# Patient Record
Sex: MALE | Race: WHITE | Employment: OTHER | URBAN - NONMETROPOLITAN AREA
[De-identification: names, ages, dates, MRNs, and addresses within clinical notes are randomized per-mention and may not be internally consistent; named-entity substitution may affect disease eponyms.]

---

## 2017-01-04 DIAGNOSIS — F33.1 MAJOR DEPRESSIVE DISORDER, RECURRENT EPISODE, MODERATE (H): Primary | ICD-10-CM

## 2017-01-06 RX ORDER — SERTRALINE HYDROCHLORIDE 100 MG/1
200 TABLET, FILM COATED ORAL DAILY
Qty: 180 TABLET | Refills: 0 | Status: SHIPPED | OUTPATIENT
Start: 2017-01-06 | End: 2017-04-03

## 2017-01-06 NOTE — TELEPHONE ENCOUNTER
PHQ-9 SCORE 12/18/2015 1/29/2016 10/7/2016   Total Score - - -   Total Score 4 9 4     SHAD-7 SCORE 12/10/2015 1/29/2016 10/7/2016   Total Score - - -   Total Score 2 0 2       Refill given.  SHARLA Riddle, RN

## 2017-01-12 ENCOUNTER — OFFICE VISIT (OUTPATIENT)
Dept: FAMILY MEDICINE | Facility: CLINIC | Age: 47
End: 2017-01-12
Payer: COMMERCIAL

## 2017-01-12 VITALS
WEIGHT: 220 LBS | SYSTOLIC BLOOD PRESSURE: 136 MMHG | RESPIRATION RATE: 18 BRPM | BODY MASS INDEX: 29.83 KG/M2 | DIASTOLIC BLOOD PRESSURE: 88 MMHG | OXYGEN SATURATION: 98 % | TEMPERATURE: 98.3 F | HEART RATE: 104 BPM

## 2017-01-12 DIAGNOSIS — I67.83 PRES (POSTERIOR REVERSIBLE ENCEPHALOPATHY SYNDROME): ICD-10-CM

## 2017-01-12 DIAGNOSIS — G89.29 CHRONIC BILATERAL LOW BACK PAIN WITHOUT SCIATICA: Primary | ICD-10-CM

## 2017-01-12 DIAGNOSIS — R56.9 CONVULSIONS, UNSPECIFIED CONVULSION TYPE (H): ICD-10-CM

## 2017-01-12 DIAGNOSIS — F10.21 ALCOHOL DEPENDENCE IN REMISSION (H): ICD-10-CM

## 2017-01-12 DIAGNOSIS — F11.20 CHRONIC NARCOTIC DEPENDENCE (H): ICD-10-CM

## 2017-01-12 DIAGNOSIS — I10 HTN, GOAL BELOW 140/90: ICD-10-CM

## 2017-01-12 DIAGNOSIS — M54.50 CHRONIC BILATERAL LOW BACK PAIN WITHOUT SCIATICA: Primary | ICD-10-CM

## 2017-01-12 PROCEDURE — 99214 OFFICE O/P EST MOD 30 MIN: CPT | Performed by: NURSE PRACTITIONER

## 2017-01-12 RX ORDER — MORPHINE SULFATE 30 MG/1
30 TABLET, FILM COATED, EXTENDED RELEASE ORAL 3 TIMES DAILY
Qty: 42 TABLET | Refills: 0 | Status: SHIPPED | OUTPATIENT
Start: 2017-01-12 | End: 2017-01-12

## 2017-01-12 RX ORDER — OXYCODONE HYDROCHLORIDE 15 MG/1
15 TABLET ORAL EVERY 4 HOURS PRN
Qty: 84 TABLET | Refills: 0 | Status: SHIPPED | OUTPATIENT
Start: 2017-01-12 | End: 2017-01-12

## 2017-01-12 RX ORDER — OXYCODONE HYDROCHLORIDE 15 MG/1
15 TABLET ORAL EVERY 4 HOURS PRN
Qty: 84 TABLET | Refills: 0 | Status: SHIPPED | OUTPATIENT
Start: 2017-01-26 | End: 2017-02-17

## 2017-01-12 RX ORDER — DIAZEPAM 5 MG
5 TABLET ORAL 2 TIMES DAILY PRN
Qty: 60 TABLET | Refills: 3 | Status: SHIPPED | OUTPATIENT
Start: 2017-01-12 | End: 2017-07-12

## 2017-01-12 RX ORDER — MORPHINE SULFATE 30 MG/1
30 TABLET, FILM COATED, EXTENDED RELEASE ORAL 3 TIMES DAILY
Qty: 42 TABLET | Refills: 0 | Status: SHIPPED | OUTPATIENT
Start: 2017-01-26 | End: 2017-02-17

## 2017-01-12 NOTE — NURSING NOTE
Chief Complaint   Patient presents with     Back Pain       Initial /88 mmHg  Pulse 104  Temp(Src) 98.3  F (36.8  C) (Tympanic)  Resp 18  Wt 220 lb (99.791 kg)  SpO2 98% Estimated body mass index is 29.83 kg/(m^2) as calculated from the following:    Height as of 8/26/16: 6' (1.829 m).    Weight as of this encounter: 220 lb (99.791 kg).  BP completed using cuff size: large

## 2017-01-12 NOTE — PATIENT INSTRUCTIONS
[facility] number- [phone]  Need to call before 8 am to check for a bed  Start callin as soon as you can   Plan I also recommend will be seeing Mikayla Nieves MD thereafter for Suboxone.    This will be the last time I refill the narcotics for you.

## 2017-01-12 NOTE — PROGRESS NOTES
SUBJECTIVE:                                                    Torey Coreas is a 46 year old male who presents to clinic today for the following health issues:        Chronic Pain Follow-Up       Type / Location of Pain: Back, shoulders and neck pain  Analgesia/pain control:       Recent changes:  same      Overall control: Tolerable with discomfort  Activity level/function:      Daily activities:  Able to do moderate activities    Work:  Unable to work  Adverse effects:  No  Adherance    Taking medication as directed?  Yes    Participating in other treatments: yes, patient is going to the pain clinic  Risk Factors:    Sleep:  Poor    Mood/anxiety:  controlled    Recent family or social stressors:  none noted    Other aggravating factors: prolonged sitting, prolonged standing and poor posture  PHQ-9 SCORE 12/18/2015 1/29/2016 10/7/2016   Total Score - - -   Total Score 4 9 4     SHAD-7 SCORE 12/10/2015 1/29/2016 10/7/2016   Total Score - - -   Total Score 2 0 2     Encounter-Level CSA - 3/21/16:               Controlled Substance Agreement - Scan on 3/28/2016  2:09 PM : CONTROLLED SUBSTANCE AGREEMENT 03/21/2016 (below)                 Amount of exercise or physical activity: None    Problems taking medications regularly: No    Medication side effects: none    Diet: regular (no restrictions)    Patient was seen by pain/substance abuse specialist Mikayla Nieves 12/30   Recommendation is inpatient detox at [place] [place]   She would then prescribe suboxone     I spoke with her on Tuesday and agreed that this was the best plan    i called the Darrell also on Tuesday and he is in agreement with doing this but says that it cant happen for at least 2 weeks as he has to make arrangements for his son and make sure his wife will not be traveling for work     I have agreed to refill his narcotics for this last month  He understands that I will not be prescribing any narcotics for him from this point on as it is not safe nor is it  "recommended for him     He has continued sobriety today  He reports having \"food poisoning\"  With vomiting and diarrhea  He is not concerned by this   Denies any need to intervention or further testing  Reports it is self resolving           Problem list and histories reviewed & adjusted, as indicated.  Additional history: as documented    Problem list, Medication list, Allergies, and Medical/Social/Surgical histories reviewed in [facility] and updated as appropriate.    ROS:  Constitutional, HEENT, cardiovascular, pulmonary, gi and gu systems are negative, except as otherwise noted.    OBJECTIVE:                                                    /88 mmHg  Pulse 104  Temp(Src) 98.3  F (36.8  C) (Tympanic)  Resp 18  Wt 220 lb (99.791 kg)  SpO2 98%  Body mass index is 29.83 kg/(m^2).  GENERAL APPEARANCE: healthy, alert and no distress  ORTHO: ambulated with a stiff slow gait, decreased ROM of lumbar spine  PSYCH: mentation appears normal and affect normal/bright    Diagnostic test results:  Diagnostic Test Results:  none      ASSESSMENT/PLAN:                                                    1. Chronic bilateral low back pain without sciatica  Discussed with patient over the phone and today that this would be the last time he would be prescribed narcotics from me as it is not safe and plan has been put into place by specialist for detox and then suboxone   He tells me he is unsure about the suboxone but verbalized understanding that I will no longer be prescribing these  The number was provided to [facility] detox, he has been advised to call first thing in the morning before 8 am to see about bed availability    - diazepam (VALIUM) 5 MG tablet; Take 1 tablet (5 mg) by mouth 2 times daily as needed  Dispense: 60 tablet; Refill: 3  - morphine (MS CONTIN) 30 MG 12 hr tablet; Take 1 tablet (30 mg) by mouth 3 times daily  Dispense: 42 tablet; Refill: 0  - oxyCODONE (ROXICODONE) 15 MG IR tablet; Take 1 tablet (15 mg) " by mouth every 4 hours as needed for moderate to severe pain  Dispense: 84 tablet; Refill: 0    2. Chronic narcotic dependence (H)  As above    3. Convulsions, unspecified convulsion type (H)  stable    4. Alcohol dependence in remission (H)  stable    5. PRES (posterior reversible encephalopathy syndrome)  Resolved per neurology/ recent MRI    6. HTN, goal below 140/90  Stable today       Patient Instructions   [facility] number- [phone]  Need to call before 8 am to check for a bed  Start callin as soon as you can   Plan I also recommend will be seeing Mikayla Nieves MD thereafter for Suboxone.    This will be the last time I refill the narcotics for you.             Irene Shultz NP  [facility]

## 2017-01-30 DIAGNOSIS — G40.909 SEIZURE DISORDER (H): Primary | ICD-10-CM

## 2017-01-30 RX ORDER — LEVETIRACETAM 1000 MG/1
1000 TABLET ORAL 2 TIMES DAILY
Qty: 180 TABLET | Refills: 1 | Status: SHIPPED | OUTPATIENT
Start: 2017-01-30 | End: 2017-08-11

## 2017-01-30 NOTE — TELEPHONE ENCOUNTER
LEVETIRACETAM      Last Written Prescription Date: 4/28/16  Last Fill Quantity: 180,  # refills: 1   Last Office Visit with [facility], [facility] or [facility] prescribing provider: 1/12/17

## 2017-01-31 ENCOUNTER — TELEPHONE (OUTPATIENT)
Dept: FAMILY MEDICINE | Facility: CLINIC | Age: 47
End: 2017-01-31

## 2017-01-31 NOTE — TELEPHONE ENCOUNTER
Patients wife Britni left a message stating she would like to speak with Liana regarding Darrell's treatment plan so she can better understand it. She is authorized per release.    Kayli Irene-Station [place]

## 2017-01-31 NOTE — TELEPHONE ENCOUNTER
Britni has concerned with patient going through detox and pain not being adequately controlled. She has done some research on suboxone. I have recommended that she contact Dr. Nieves regarding these questions and concerns. I reinforced my agreement with this plan.   Liana Shultz NP

## 2017-02-01 ENCOUNTER — TELEPHONE (OUTPATIENT)
Dept: PALLIATIVE MEDICINE | Facility: CLINIC | Age: 47
End: 2017-02-01

## 2017-02-01 NOTE — TELEPHONE ENCOUNTER
Wife Jessica lm asking to speak with Mikayla Nieves about her .  Jessica states there is a signed release allowing this.     Chart reviewed.  The most recent LISA  was signed 3-25-15, expires one year after this date.   Brenda Severson RN, BA

## 2017-02-01 NOTE — TELEPHONE ENCOUNTER
Called and left a message for Jessica that the LISA had  and Nurse will send  another one for her  to sign .  Margo perez rn

## 2017-02-14 ENCOUNTER — TELEPHONE (OUTPATIENT)
Dept: PALLIATIVE MEDICINE | Facility: CLINIC | Age: 47
End: 2017-02-14

## 2017-02-14 NOTE — TELEPHONE ENCOUNTER
Message left at 1418:    Wife, Jessica, states that a new authorization to speak with providers was completed and was sent last Tuesday so hoping that it was received and that someone will be able to call her back. States that pt has withdrawn from opioid medications and she would like to talk about next steps. Please call back at [phone].   -----  Will route to nurse [place] for review.     TO ShettyN, RN-BC  Patient Care Supervisor/Care Coordinator  [facility] [place]

## 2017-02-15 ENCOUNTER — MYC MEDICAL ADVICE (OUTPATIENT)
Dept: ADDICTION MEDICINE | Facility: CLINIC | Age: 47
End: 2017-02-15

## 2017-02-15 ENCOUNTER — MYC MEDICAL ADVICE (OUTPATIENT)
Dept: PALLIATIVE MEDICINE | Facility: CLINIC | Age: 47
End: 2017-02-15

## 2017-02-15 NOTE — TELEPHONE ENCOUNTER
I called Keron to gather more information. He has not had any opioids since 2/9/17 at noon. He reports going through severe withdrawal at his home but reports that has now resolved. He is having extreme back pain and is only laying in bed. He is unable to come for an office visit because his wife who drives him is out of town but he would like you to fax a prescription for Suboxone to his pharmacy. [phone].     Sheree Yun, TON, RN  Care Coordinator  [facility] [place]

## 2017-02-15 NOTE — TELEPHONE ENCOUNTER
I left a voicemail for Jessica letting her know that I do not see the release to communicate in [facility] chart but that I will keep my out for it and call her back when I see it . Another option would be for her to drop it off or to re fax it.     TO HebertN, RN  Care Coordinator  [facility] [place]

## 2017-02-15 NOTE — TELEPHONE ENCOUNTER
[place] from patient 2/15/17 10:18 am    Dr Nieves,    I have detoxed at home since Thursday at 12p.m. because there were no beds at [facility]. But I am not really pleased with this whole process, been laying in bed now for 6 days with no respose. Are you able to call in a script to [facility] in [place]?  If there are any doubt about detox, I can have blood drawn here in [place].    Sincerely,   Torey Coreas  [phone]    Sheree Yun, TON, RN  Care Coordinator  [facility]

## 2017-02-15 NOTE — TELEPHONE ENCOUNTER
[employer] message sent to patient:      Torey,     I have not seen you since our initial visit in mid December.  I would need to see you in the office to initiate suboxone maintenance.  I do not do this over the phone or [employer].  I would recommend calling [facility] detox if you are having trouble with withdrawals.  This was the plan that your PCP and I had discussed.      Call [facility] intake at [phone] at 0800 preferably prior to going to the ER to inquire about bed availability.     Mikayla Nieves MD

## 2017-02-16 ENCOUNTER — TELEPHONE (OUTPATIENT)
Dept: FAMILY MEDICINE | Facility: CLINIC | Age: 47
End: 2017-02-16

## 2017-02-16 NOTE — TELEPHONE ENCOUNTER
Spoke with pt who relays same info as 02-15-17 [employer] message regarding opioid withdrawal and request for suboxone.  Has not viewed [employer] reply from Dr. Nieves as noted below, informed pt of Dr. Nieves's recommendations.  States feels like has already detoxed and denied need to contact [facility].  Advised pt reply to Dr. Nieves's message and also call pain clinic to schedule appt with Dr. Nieves.  Forwarded to Liana for review, any further recommendations.  SHARLA Riddle RN      Note      [employer] message sent to patient:        Torey,      I have not seen you since our initial visit in mid December. I would need to see you in the office to initiate suboxone maintenance. I do not do this over the phone or [employer]. I would recommend calling [facility] detox if you are having trouble with withdrawals. This was the plan that your PCP and I had discussed.      Call [facility] Det[facility] intake at [phone] at 0800 preferably prior to going to the ER to inquire about bed availability.      Mikayla Nieves MD

## 2017-02-16 NOTE — TELEPHONE ENCOUNTER
Darrell has scheduled an appointment for 3:00 pm tomorrow.    TO HebertN, RN  Care Coordinator  [facility] [place]

## 2017-02-16 NOTE — TELEPHONE ENCOUNTER
Dr Nieves,    [facility]? That's a joke, never a bed to be had! [facility]way, it has been 7 days as of noon today, so I am opiate free. I will try to set up an appt here, and I'll call.    Torey Coreas  ----- Message -----  From: Mikayla Nieves MD  Sent: 2/15/2017  3:44 PM CST  To: Darrell Coreas  Subject: suboxone    Torey,     I have not seen you since our initial visit in mid December.  I would need to see you in the office to initiate suboxone maintenance.  I do not do this over the phone or [employer].  I would recommend calling [facility] detox if you are having trouble with withdrawals.  This was the plan that your PCP and I had discussed.      Call [facility] Detox intake at [phone] at 0800 preferably prior to going to the ER to inquire about bed availability.     Mikayla Nieves MD

## 2017-02-16 NOTE — TELEPHONE ENCOUNTER
Patient is calling requesting to speak with Liana Shultz. He states he has called his pain provider and hasn't heard anything. Is looking for some advise from Liana.    Kayli Irene-Station

## 2017-02-17 ENCOUNTER — OFFICE VISIT (OUTPATIENT)
Dept: PALLIATIVE MEDICINE | Facility: CLINIC | Age: 47
End: 2017-02-17
Payer: COMMERCIAL

## 2017-02-17 VITALS — SYSTOLIC BLOOD PRESSURE: 110 MMHG | HEART RATE: 96 BPM | RESPIRATION RATE: 100 BRPM | DIASTOLIC BLOOD PRESSURE: 72 MMHG

## 2017-02-17 DIAGNOSIS — Z79.899 ENCOUNTER FOR LONG-TERM (CURRENT) USE OF HIGH-RISK MEDICATION: Primary | ICD-10-CM

## 2017-02-17 DIAGNOSIS — M54.5 CHRONIC MIDLINE LOW BACK PAIN, WITH SCIATICA PRESENCE UNSPECIFIED: ICD-10-CM

## 2017-02-17 DIAGNOSIS — F11.20 OPIOID USE DISORDER, SEVERE, ON MAINTENANCE THERAPY, DEPENDENCE (H): ICD-10-CM

## 2017-02-17 DIAGNOSIS — G89.29 CHRONIC MIDLINE LOW BACK PAIN, WITH SCIATICA PRESENCE UNSPECIFIED: ICD-10-CM

## 2017-02-17 DIAGNOSIS — F10.10 ALCOHOL ABUSE: ICD-10-CM

## 2017-02-17 DIAGNOSIS — Z79.899 ENCOUNTER FOR LONG-TERM (CURRENT) USE OF HIGH-RISK MEDICATION: ICD-10-CM

## 2017-02-17 DIAGNOSIS — F41.1 GENERALIZED ANXIETY DISORDER: ICD-10-CM

## 2017-02-17 LAB
AMPHETAMINES UR QL: ABNORMAL
BARBITURATES UR QL: ABNORMAL
BENZODIAZ UR QL: ABNORMAL
BUPRENORPHINE UR QL: NEGATIVE
CANNABINOIDS UR QL: ABNORMAL
COCAINE UR QL: ABNORMAL
MDA UR QL SCN: ABNORMAL
METHADONE UR QL SCN: ABNORMAL
METHAMPHET UR QL SCN: ABNORMAL
OPIATES UR QL SCN: ABNORMAL
OXYCODONE UR QL: ABNORMAL
PCP UR QL SCN: ABNORMAL
TRICYCLICS UR QL SCN: ABNORMAL

## 2017-02-17 PROCEDURE — 99214 OFFICE O/P EST MOD 30 MIN: CPT | Performed by: ANESTHESIOLOGY

## 2017-02-17 PROCEDURE — 80306 DRUG TEST PRSMV INSTRMNT: CPT | Performed by: ANESTHESIOLOGY

## 2017-02-17 RX ORDER — BUPRENORPHINE AND NALOXONE 8; 2 MG/1; MG/1
1 FILM, SOLUBLE BUCCAL; SUBLINGUAL DAILY
Qty: 7 FILM | Refills: 0 | Status: SHIPPED | OUTPATIENT
Start: 2017-02-17 | End: 2017-02-24

## 2017-02-17 ASSESSMENT — PAIN SCALES - GENERAL: PAINLEVEL: EXTREME PAIN (8)

## 2017-02-17 NOTE — NURSING NOTE
No chief complaint on file.      Initial /72  Pulse 96  Resp (!) 100 Estimated body mass index is 29.84 kg/(m^2) as calculated from the following:    Height as of 8/26/16: 1.829 m (6').    Weight as of 1/12/17: 99.8 kg (220 lb).  Medication Reconciliation: complete     Eloy Hanna MA  [facility]

## 2017-02-17 NOTE — PATIENT INSTRUCTIONS
Nurse Triage line:  [phone]   Call this number with any questions or concerns. You may leave a detailed message anytime. Calls are typically returned Monday through Friday between 8 AM and 4:30 PM. We usually get back to you within 2 business days depending on the issue/request.       Medication refills:    For non-narcotic medications, call your pharmacy directly to request a refill. The pharmacy will contact the [facility] Management Center for authorization. Please allow 3-4 days for these refills to be processed.     For narcotic refills, call the nurse triage line or send a [employer] message. Please contact us 7-10 days before your refill is due. The message MUST include the name of the specific medication(s) requested and how you would like to receive the prescription(s). The options are as follows:    Pain Clinic staff can mail the prescription to your pharmacy. Please tell us the name of the pharmacy.    You may pick the prescription up at the Pain Clinic (tell us the location) or during a clinic visit with your pain provider    Pain Clinic staff can deliver the prescription to the [facility] pharmacy in the clinic building. Please tell us the location.      Scheduling number: [phone].  Call this number to schedule or change appointments.    We believe regular attendance is key to your success in our program.    Any time you are unable to keep your appointment we ask that you call us at least 24 hours in advance to let us know. This will allow us to offer the appointment time to another patient.

## 2017-02-17 NOTE — PROGRESS NOTES
[facility]    Date of visit: 2/17/2017    Chief complaint: No chief complaint on file.      Interval history:  Torey Coreas is a 46 year old male here for Chronic pain and addiction follow up.   Last visit: 12/30/2016    CURRENT DOSE: not on suboxone    Recommendations/plan at the last visit included:  1. Physical Therapy: YES, chronic pain specific. Pt is not interested as he does not want to drive down here from [place]  2. Clinical Health Psychologist to address issues of relaxation, behavioral change, coping style, and other factors important to improvement: YES, pt is not willing to engage with this at this time  3. Self Care Instructions/Recommendations: Develop a daily walking program and continue stretching     Medication Management:   The patient is currently on really high doses of opioids. His history of EtOh abuse and unintentional overdose puts him at high risk for taking these medications. Current CDC guidelines recommend less than 90 morphine eq/day and he is currently on over 210 morphine Eq. He has developed tolerance to the medication and is asking to increase the dosage and switch back to oxycontin (recently changed to MS contin which he states is not effective)  In terms of efficacy, Torey Coreas is not getting good pain relief from the medication and his function is not improved. He is not working, has not developed any type of exercise program and is does not have an active social life.   I do not recommend continuing chronic opioid therapy for Torey Coreas and this was discussed at length during today's visit. If it is continued I would recommend titrating his dose down to the CDC recommended guideline of less than 90 morphine eq. I believe the best course of treatment would be that he go to detox to get off his current opioids followed by an inpatient treatment. He is not a candidate for a home taper given the high doses he is  on. He is a good candidate for ongoing suboxone maintenance which is used for both addiction and chronic pain and I would be willing to prescribe this for him. Unfortunately, the patient was not receptive to this plan at this time and would like to first go home and do some research on this medication. He was given my card and the clinic phone number.      THE 4 A's OF OPIOID MAINTENANCE ANALGESIA:   Analgesia: no adequate  Activity: unable to work or exercise   Adverse effects: none  Adherence to Rx protocol: poor (drinking and using benzos while on opioids and history of overdose)      4. Follow up: With Dr. Nieves if you should decide to engage in multidisciplinary care for your chronic pain.     Since his last visit, Torey Coreas reports:  - He was able to wean himself off narcotics at home and has been off now for almost a week. The withdrawal was terrible and he had sweating, diarrhea and has been unable to sleep.   - Was not able to get into detox at [facility] despite many attempts - no beds  - Is here with his wife and son and they have a lot of questions about suboxone  - Is open minded to letting this work but frusrated with his pain and worried about the future      Status since last visit:    Since last visit patient has been: stable.     Intensity:     There has been: moderate craving.      Suboxone Dose: not on yet  Progression of Symptoms:     Cues to use and relapse triggers: none    Recovery program has been: weak.   Accompanying Signs & Symptoms:    Side Effects: none.    Sobriety:     Status: no use since last visit.      Drug Screen: obtained.   Precipitating factors:    Triggers have been: non-existent.   Alleviating factors:    Contact with sponsor has been: no sponsor.     Family and support system has been: helpful.   Other Therapies Tried :     Patient has been going to recovery meetings:not at all.      Patient has been participating in professional counseling/therapy: WILIAN Whitting[facility]  Board of Pharmacy Data Base Reviewed:    YES; all controlled substances from PCP. Last prescription for oxycodone 15mg #6/day given on 1/26/2017 (14 day supply)    Pain scores:  Pain intensity on average is 8 on a scale of 0-10.     Side Effects: no side effect    Medications:  Current Outpatient Prescriptions   Medication Sig Dispense Refill     levETIRAcetam 1000 MG TABS Take 1,000 mg by mouth 2 times daily 180 tablet 1     diazepam (VALIUM) 5 MG tablet Take 1 tablet (5 mg) by mouth 2 times daily as needed 60 tablet 3     morphine (MS CONTIN) 30 MG 12 hr tablet Take 1 tablet (30 mg) by mouth 3 times daily 42 tablet 0     oxyCODONE (ROXICODONE) 15 MG IR tablet Take 1 tablet (15 mg) by mouth every 4 hours as needed for moderate to severe pain 84 tablet 0     sertraline (ZOLOFT) 100 MG tablet Take 2 tablets (200 mg) by mouth daily 180 tablet 0     carvedilol (COREG) 25 MG tablet Take 1 tablet (25 mg) by mouth 2 times daily (with meals) 180 tablet 1     folic acid (FOLVITE) 1 MG tablet Take 1 tablet (1 mg) by mouth daily 90 tablet 1     hydrochlorothiazide (HYDRODIURIL) 25 MG tablet Take 1 tablet (25 mg) by mouth daily 90 tablet 1     amitriptyline (ELAVIL) 100 MG tablet Take 1 tablet (100 mg) by mouth nightly as needed for sleep       triamcinolone (KENALOG) 0.1 % cream Apply sparingly to affected area three times daily for 14 days. 30 g 0     betamethasone valerate (VALISONE) 0.1 % cream Apply topically 2 times daily 15 g 1       Medical History: any changes in medical history since they were last seen? No      OBJECTIVE:                                                    /72  Pulse 96  Resp (!) 100  There is no height or weight on file to calculate BMI.     ROS:  Constitutional, neuro, ENT, endocrine, pulmonary, cardiac, gastrointestinal, genitourinary, musculoskeletal, integument and psychiatric systems are negative, except as otherwise noted.    EXAM:  GENERAL APPEARANCE: healthy, alert and no  distress  EYES: Eyes grossly normal to inspection and conjunctivae and sclerae normal  MS: extremities normal- no gross deformities noted  SKIN: no suspicious lesions or rashes  PSYCH: mentation appears normal and affect normal/bright  MENTAL STATUS EXAM:  Appearance/Behavior: No apparent distress and Neatly groomed  Speech: Normal  Mood/Affect: normal affect  Insight: Adequate    Diagnostic Test Results:  Results for orders placed or performed in visit on 02/17/17 (from the past 24 hour(s))   Drug abuse screen (NL, RW)   Result Value Ref Range    Methamphetamine Qual Urine  NEG     Negative   Cutoff for a negative methamphetamine is 1000 ng/mL or less.      Cocaine Qual Urine  NEG     Negative   Cutoff for a negative cocaine is 300 ng/mL or less.      Cannabinoids Qual Urine  NEG     Negative   Cutoff for a negative cannabinoid is 50 ng/mL or less.      MDMA Qual Urine  NEG     Negative   Cutoff for a negative MDMA (ecstasy) is 500 ng/mL or less.      Methadone Qual Urine  NEG     Negative   Cutoff for a negative methadone is 300 ng/mL or less.      Opiates Qualitative Urine  NEG     Negative   Cutoff for a negative opiate is 300 ng/mL or less.      Benzodiazepine Qual Urine (A) NEG     Positive   Cutoff for a positive benzodiazepine is greater than 300 ng/mL. This is an   unconfirmed screening result to be used for medical purposes only.      Tricyc Anti Qual Urine (A) NEG     Positive   Cutoff for a positive tricyclic antidepressant is greater than 1000 ng/mL.   This is an unconfirmed screening result to be used for medical purposes only.      Barbiturates Qual Urine  NEG     Negative   Cutoff for a negative barbituate is 300 ng/mL or less.      PCP Qual Urine  NEG     Negative   Cutoff for a negative PCP is 25 ng/mL or less.      Amphetamine Qual Urine  NEG     Negative   Cutoff for a negative amphetamine is 1000 ng/mL or less.      Oxycodone Qual Urine  NEG     Negative   Cutoff for a negative Oxycodone is 100  ng/mL or less.     Buprenorphine Qual Urine   Result Value Ref Range    Buprenorphine Qual Urine Negative             ASSESSMENT:                                                      OPIOID USE DISORDER   ALCOHOL USE DISORDER    ENCOUNTER FOR LONG TERM USE OF HIGH RISK MEDICATION   High Risk Drug Monitoring?  YES   Drug being monitored: Suboxone    Reason for drug: Opioid Use Disorder   What is being monitored?: Dosage, Cravings, Trigger, side effects, and continued abstinence.  CHRONIC LOW BACK PAIN  ANXIETY    PLAN:                                                      1. Physical Therapy:  Consider once stable  2. Clinical Health Psychologist to address issues of relaxation, behavioral change, coping style, and other factors important to improvement.  YES, schedule at next visit  3. Diagnostic Studies:  none  4. Medication Management:  Initiate suboxone at home - will start with 1/4 of a strip 2mg and wait a couple hours then take another 2mg if necessary.  May take up to 8mg/day.  Do not drive until you know how this medication makes you feel.   5. Further procedures recommended: not at this time  6. Follow up: With Dr. Nieves in 1 week, call with an update on Monday        ICD-10-CM    1. Encounter for long-term (current) use of high-risk medication Z79.899 Drug abuse screen (NL, RW)     Buprenorphine Qual Urine   2. Opioid use disorder, severe, on maintenance therapy, dependence (H) F11.20 buprenorphine HCl-naloxone HCl (SUBOXONE) 8-2 MG per film   3. Chronic midline low back pain, with sciatica presence unspecified M54.5     G89.29    4. Alcohol abuse F10.10    5. Generalized anxiety disorder F41.1          MEDICATIONS:   Orders Placed This Encounter   Medications     buprenorphine HCl-naloxone HCl (SUBOXONE) 8-2 MG per film     Sig: Place 1 Film under the tongue daily for 7 days     Dispense:  7 Film     Refill:  0          - Continue other medications without change  FUTURE APPOINTMENTS:       - Follow-up  visit in 1 week, call on Monday with an update    Total time spent was 30 minutes, and more than 50% of face to face time was spent in counseling and/or coordination of care.      [facility]  2/17/2017

## 2017-02-17 NOTE — MR AVS SNAPSHOT
After Visit Summary   2/17/2017    Torey Coreas    MRN: [id number]           Patient Information     Date Of Birth          1970        Visit Information        Provider Department      2/17/2017 3:00 PM Mikayla Nieves MD [facility]        Today's Diagnoses     Encounter for long-term (current) use of high-risk medication    -  1    Opioid use disorder, severe, on maintenance therapy, dependence (H)        Chronic midline low back pain, with sciatica presence unspecified        Alcohol abuse        Generalized anxiety disorder          Care Instructions        Nurse Triage line:  [phone]   Call this number with any questions or concerns. You may leave a detailed message anytime. Calls are typically returned Monday through Friday between 8 AM and 4:30 PM. We usually get back to you within 2 business days depending on the issue/request.       Medication refills:    For non-narcotic medications, call your pharmacy directly to request a refill. The pharmacy will contact the [facility] for authorization. Please allow 3-4 days for these refills to be processed.     For narcotic refills, call the nurse triage line or send a [employer] message. Please contact us 7-10 days before your refill is due. The message MUST include the name of the specific medication(s) requested and how you would like to receive the prescription(s). The options are as follows:    Pain Clinic staff can mail the prescription to your pharmacy. Please tell us the name of the pharmacy.    You may pick the prescription up at the Pain Clinic (tell us the location) or during a clinic visit with your pain provider    Pain Clinic staff can deliver the prescription to the [facility] pharmacy in the clinic building. Please tell us the location.      Scheduling number: [phone].  Call this number to schedule or change appointments.    We believe regular attendance is key to your success in our  program.    Any time you are unable to keep your appointment we ask that you call us at least 24 hours in advance to let us know. This will allow us to offer the appointment time to another patient.             Follow-ups after your visit        Your next 10 appointments already scheduled     Feb 24, 2017 11:00 AM CST   Return Visit with Mikayla Nieves MD   [facility] ([facility]    [address]  [address]  [facility] [address] [phone]              Future tests that were ordered for you today     Open Standing Orders        Priority Remaining Interval Expires Ordered    Drug abuse screen (NL, RW) Routine 99/100  2/17/2018 2/17/2017    Buprenorphine Qual Urine Routine 99/100  2/17/2018 2/17/2017            Who to contact     If you have questions or need follow up information about today's clinic visit or your schedule please contact [facility] [place] directly at [phone].  Normal or non-critical lab and imaging results will be communicated to you by [employer], letter or phone within 4 business days after the clinic has received the results. If you do not hear from us within 7 days, please contact the clinic through [employer] or phone. If you have a critical or abnormal lab result, we will notify you by phone as soon as possible.  Submit refill requests through [employer] or call your pharmacy and they will forward the refill request to us. Please allow 3 business days for your refill to be completed.          Additional Information About Your Visit        [employer] Information     [employer]K gives you secure access to your electronic health record. If you see a primary care provider, you can also send messages to your care team and make appointments. If you have questions, please call your primary care clinic.  If you do not have a primary care provider, please call [phone] and they will assist you.        Care EveryWhere ID     This is your Care EveryWhere  ID. This could be used by other organizations to access your [place] medical records  [id number]        Your Vitals Were     Pulse Respirations                96 100           Blood Pressure from Last 3 Encounters:   02/17/17 110/72   01/12/17 136/88   12/30/16 (!) 137/97    Weight from Last 3 Encounters:   01/12/17 99.8 kg (220 lb)   12/30/16 103.8 kg (228 lb 12.8 oz)   12/16/16 100.7 kg (222 lb)                 Today's Medication Changes          These changes are accurate as of: 2/17/17  3:47 PM.  If you have any questions, ask your nurse or doctor.               Start taking these medicines.        Dose/Directions    buprenorphine HCl-naloxone HCl 8-2 MG per film   Commonly known as:  SUBOXONE   Used for:  Opioid use disorder, severe, on maintenance therapy, dependence (H)   Started by:  Mikayla Nieves MD        Dose:  1 Film   Place 1 Film under the tongue daily for 7 days   Quantity:  7 Film   Refills:  0            Where to get your medicines      Some of these will need a paper prescription and others can be bought over the counter.  Ask your nurse if you have questions.     Bring a paper prescription for each of these medications     buprenorphine HCl-naloxone HCl 8-2 MG per film                Primary Care Provider Office Phone # Fax #    Irene PANCHITO Shultz [phone]       [address] [address]        Goals        General    I want to start CD treatment program this year.  started 11-19-15 (pt-stated)     Notes - Note created  11/19/2015 12:55 PM by Michelle Blanca LSW    As of today's date 11/19/2015 goal is met at 26 - 50%.   Goal Status:  Active   Assessment completed at [facility]          Thank you!     Thank you for choosing [facility] [place]  for your care. Our goal is always to provide you with excellent care. Hearing back from our patients is one way we can continue to improve our services. Please take a few  minutes to complete the written survey that you may receive in the mail after your visit with us. Thank you!             Your Updated Medication List - Protect others around you: Learn how to safely use, store and throw away your medicines at www.disposemymeds.org.          This list is accurate as of: 2/17/17  3:47 PM.  Always use your most recent med list.                   Brand Name Dispense Instructions for use    amitriptyline 100 MG tablet    ELAVIL     Take 1 tablet (100 mg) by mouth nightly as needed for sleep       betamethasone valerate 0.1 % cream    VALISONE    15 g    Apply topically 2 times daily       buprenorphine HCl-naloxone HCl 8-2 MG per film    SUBOXONE    7 Film    Place 1 Film under the tongue daily for 7 days       carvedilol 25 MG tablet    COREG    180 tablet    Take 1 tablet (25 mg) by mouth 2 times daily (with meals)       diazepam 5 MG tablet    VALIUM    60 tablet    Take 1 tablet (5 mg) by mouth 2 times daily as needed       folic acid 1 MG tablet    FOLVITE    90 tablet    Take 1 tablet (1 mg) by mouth daily       hydrochlorothiazide 25 MG tablet    HYDRODIURIL    90 tablet    Take 1 tablet (25 mg) by mouth daily       levETIRAcetam 1000 MG Tabs     180 tablet    Take 1,000 mg by mouth 2 times daily       sertraline 100 MG tablet    ZOLOFT    180 tablet    Take 2 tablets (200 mg) by mouth daily       triamcinolone 0.1 % cream    KENALOG    30 g    Apply sparingly to affected area three times daily for 14 days.

## 2017-02-20 ENCOUNTER — TELEPHONE (OUTPATIENT)
Dept: PALLIATIVE MEDICINE | Facility: CLINIC | Age: 47
End: 2017-02-20

## 2017-02-20 NOTE — TELEPHONE ENCOUNTER
Nurse Line Message 2/20/17 11:40     Patient calling to give an update on new medication. Call back number is [phone].    TO HebertN, RN  Care Coordinator  [facility] [place]

## 2017-02-20 NOTE — TELEPHONE ENCOUNTER
Received PA request from [facility] pharmacy for the  buprenorphine HCl-naloxone HCl (SUBOXONE) 8-2 MG per film  Pt ID [id number]  Insurance # [phone]  Called [employer] to initiate PA. Medication was approved     Case # [id number]  Approved 1/21/17 to 2/20/2020  Pt and pharmacy are informed     Eloy Hanna MA  [facility] Center

## 2017-02-20 NOTE — TELEPHONE ENCOUNTER
Keron called to update Dr. Nieves that the Suboxone is working well for him. He reports its been surprisingly effective and he is having no side effects.    TO HebertN, RN  Care Coordinator  [facility] [place]

## 2017-02-24 ENCOUNTER — OFFICE VISIT (OUTPATIENT)
Dept: PALLIATIVE MEDICINE | Facility: CLINIC | Age: 47
End: 2017-02-24
Payer: COMMERCIAL

## 2017-02-24 VITALS
OXYGEN SATURATION: 96 % | HEART RATE: 76 BPM | SYSTOLIC BLOOD PRESSURE: 127 MMHG | DIASTOLIC BLOOD PRESSURE: 87 MMHG | BODY MASS INDEX: 32.82 KG/M2 | WEIGHT: 242 LBS | RESPIRATION RATE: 16 BRPM

## 2017-02-24 DIAGNOSIS — G89.29 CHRONIC MIDLINE LOW BACK PAIN, WITH SCIATICA PRESENCE UNSPECIFIED: ICD-10-CM

## 2017-02-24 DIAGNOSIS — Z79.899 ENCOUNTER FOR LONG-TERM (CURRENT) USE OF HIGH-RISK MEDICATION: ICD-10-CM

## 2017-02-24 DIAGNOSIS — F11.20 OPIOID USE DISORDER, SEVERE, ON MAINTENANCE THERAPY, DEPENDENCE (H): Primary | ICD-10-CM

## 2017-02-24 DIAGNOSIS — F41.1 GENERALIZED ANXIETY DISORDER: ICD-10-CM

## 2017-02-24 DIAGNOSIS — M54.5 CHRONIC MIDLINE LOW BACK PAIN, WITH SCIATICA PRESENCE UNSPECIFIED: ICD-10-CM

## 2017-02-24 DIAGNOSIS — F10.10 ALCOHOL ABUSE: ICD-10-CM

## 2017-02-24 PROCEDURE — 99214 OFFICE O/P EST MOD 30 MIN: CPT | Performed by: ANESTHESIOLOGY

## 2017-02-24 RX ORDER — BUPRENORPHINE AND NALOXONE 8; 2 MG/1; MG/1
1 FILM, SOLUBLE BUCCAL; SUBLINGUAL
Qty: 56 FILM | Refills: 0 | Status: SHIPPED | OUTPATIENT
Start: 2017-02-24 | End: 2017-03-24

## 2017-02-24 ASSESSMENT — PAIN SCALES - GENERAL: PAINLEVEL: MODERATE PAIN (5)

## 2017-02-24 NOTE — MR AVS SNAPSHOT
After Visit Summary   2/24/2017    Torey Coreas    MRN: [id number]           Patient Information     Date Of Birth          1970        Visit Information        Provider Department      2/24/2017 11:00 AM Mikayla Nieves MD [facility]        Today's Diagnoses     Opioid use disorder, severe, on maintenance therapy, dependence (H)    -  1    Chronic midline low back pain, with sciatica presence unspecified        Generalized anxiety disorder        Encounter for long-term (current) use of high-risk medication        Alcohol abuse          Care Instructions    1. Make follow up for March 24th, 2017 - 4 weeks from now.     Nurse Triage line:  [phone]   Call this number with any questions or concerns. You may leave a detailed message anytime. Calls are typically returned Monday through Friday between 8 AM and 4:30 PM. We usually get back to you within 2 business days depending on the issue/request.       Medication refills:    For non-narcotic medications, call your pharmacy directly to request a refill. The pharmacy will contact the [facility] for authorization. Please allow 3-4 days for these refills to be processed.     For narcotic refills, call the nurse triage line or send a [employer] message. Please contact us 7-10 days before your refill is due. The message MUST include the name of the specific medication(s) requested and how you would like to receive the prescription(s). The options are as follows:    Pain Clinic staff can mail the prescription to your pharmacy. Please tell us the name of the pharmacy.    You may pick the prescription up at the Pain Clinic (tell us the location) or during a clinic visit with your pain provider    Pain Clinic staff can deliver the prescription to the [facility] pharmacy in the clinic building. Please tell us the location.      Scheduling number: [phone].  Call this number to schedule or change appointments.    We  believe regular attendance is key to your success in our program.    Any time you are unable to keep your appointment we ask that you call us at least 24 hours in advance to let us know. This will allow us to offer the appointment time to another patient.             Follow-ups after your visit        Who to contact     If you have questions or need follow up information about today's clinic visit or your schedule please contact [facility] directly at [phone].  Normal or non-critical lab and imaging results will be communicated to you by [employer], letter or phone within 4 business days after the clinic has received the results. If you do not hear from us within 7 days, please contact the clinic through [employer] or phone. If you have a critical or abnormal lab result, we will notify you by phone as soon as possible.  Submit refill requests through [employer] or call your pharmacy and they will forward the refill request to us. Please allow 3 business days for your refill to be completed.          Additional Information About Your Visit        Match Information     Match gives you secure access to your electronic health record. If you see a primary care provider, you can also send messages to your care team and make appointments. If you have questions, please call your primary care clinic.  If you do not have a primary care provider, please call [phone] and they will assist you.        Care EveryWhere ID     This is your Care EveryWhere ID. This could be used by other organizations to access your [facility] medical records  [id number]        Your Vitals Were     Pulse Respirations Pulse Oximetry BMI (Body Mass Index)          76 16 96% 32.82 kg/m2         Blood Pressure from Last 3 Encounters:   02/24/17 127/87   02/17/17 110/72   01/12/17 136/88    Weight from Last 3 Encounters:   02/24/17 109.8 kg (242 lb)   01/12/17 99.8 kg (220 lb)   12/30/16 103.8 kg (228 lb 12.8 oz)               Today, you had the following     No orders found for display         Today's Medication Changes          These changes are accurate as of: 2/24/17 11:28 AM.  If you have any questions, ask your nurse or doctor.               These medicines have changed or have updated prescriptions.        Dose/Directions    buprenorphine HCl-naloxone HCl 8-2 MG per film   Commonly known as:  SUBOXONE   This may have changed:  when to take this   Used for:  Opioid use disorder, severe, on maintenance therapy, dependence (H)   Changed by:  Mikayla Nieves MD        Dose:  1 Film   Place 1 Film under the tongue 2 times daily for 28 days   Quantity:  56 Film   Refills:  0            Where to get your medicines      Some of these will need a paper prescription and others can be bought over the counter.  Ask your nurse if you have questions.     Bring a paper prescription for each of these medications     buprenorphine HCl-naloxone HCl 8-2 MG per film                Primary Care Provider Office Phone # Fax #    Irene ShultzPANCHITO [phone]       [address] [address]        Goals        General    I want to start CD treatment program this year.  started 11-19-15 (pt-stated)     Notes - Note created  11/19/2015 12:55 PM by Michelle Blanca LSW    As of today's date 11/19/2015 goal is met at 26 - 50%.   Goal Status:  Active   Assessment completed at [facility]          Thank you!     Thank you for choosing [facility] [place]  for your care. Our goal is always to provide you with excellent care. Hearing back from our patients is one way we can continue to improve our services. Please take a few minutes to complete the written survey that you may receive in the mail after your visit with us. Thank you!             Your Updated Medication List - Protect others around you: Learn how to safely use, store and throw away your medicines at www.disposemymeds.org.           This list is accurate as of: 2/24/17 11:28 AM.  Always use your most recent med list.                   Brand Name Dispense Instructions for use    amitriptyline 100 MG tablet    ELAVIL     Take 1 tablet (100 mg) by mouth nightly as needed for sleep       betamethasone valerate 0.1 % cream    VALISONE    15 g    Apply topically 2 times daily       buprenorphine HCl-naloxone HCl 8-2 MG per film    SUBOXONE    56 Film    Place 1 Film under the tongue 2 times daily for 28 days       carvedilol 25 MG tablet    COREG    180 tablet    Take 1 tablet (25 mg) by mouth 2 times daily (with meals)       diazepam 5 MG tablet    VALIUM    60 tablet    Take 1 tablet (5 mg) by mouth 2 times daily as needed       folic acid 1 MG tablet    FOLVITE    90 tablet    Take 1 tablet (1 mg) by mouth daily       hydrochlorothiazide 25 MG tablet    HYDRODIURIL    90 tablet    Take 1 tablet (25 mg) by mouth daily       levETIRAcetam 1000 MG Tabs     180 tablet    Take 1,000 mg by mouth 2 times daily       sertraline 100 MG tablet    ZOLOFT    180 tablet    Take 2 tablets (200 mg) by mouth daily       triamcinolone 0.1 % cream    KENALOG    30 g    Apply sparingly to affected area three times daily for 14 days.

## 2017-02-24 NOTE — NURSING NOTE
Chief Complaint   Patient presents with     Pain       Initial /87  Pulse 76  Resp 16  Wt 109.8 kg (242 lb)  SpO2 96%  BMI 32.82 kg/m2 Estimated body mass index is 32.82 kg/(m^2) as calculated from the following:    Height as of 8/26/16: 1.829 m (6').    Weight as of this encounter: 109.8 kg (242 lb).  Medication Reconciliation: complete       Eloy Hanna MA  [facility] Management Center

## 2017-02-24 NOTE — PROGRESS NOTES
"                          [facility]    Date of visit: 2/24/2017    Chief complaint:   Chief Complaint   Patient presents with     Pain       Interval history:  Torey Coreas is a 46 year old male here for Chronic Pain and Addiction/Suboxone follow up.   Last visit: 2/17/2017    CURRENT DOSE: 8mg qday  BRIEF HPI: The patient is a 46 year old male who was referred to me by his chronic pain provider, Kaye Nelson CNP and his PCP when it became evident that he was abusing alcohol and had an unintentional opioid overdose.  He was on high dose opioids (210 morphine equivalents/day) for 6 years for chronic back pain - failed back syndrome s/p L5-S1 fusion in 2015.   Agreed to detox and eventually was able to discontinue use on his own at home and suboxone was initiated on 2/17/2017.      Recommendations/plan at the last visit included:  1. Physical Therapy: Consider once stable  2. Clinical Health Psychologist to address issues of relaxation, behavioral change, coping style, and other factors important to improvement. YES, schedule at next visit  3. Diagnostic Studies: none  4. Medication Management: Initiate suboxone at home - will start with 1/4 of a strip 2mg and wait a couple hours then take another 2mg if necessary. May take up to 8mg/day. Do not drive until you know how this medication makes you feel.   5. Further procedures recommended: not at this time  6. Follow up: With Dr. 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       Status since last visit:    Since last visit " patient has been: doing well.     Intensity:     There has been: no craving.      Suboxone Dose: adequate.   Progression of Symptoms:     Cues to use and relapse triggers: none    Recovery program has been: active.   Accompanying Signs & Symptoms:    Side Effects: none.    Sobriety:     Status: no use since last visit.      Drug Screen: patient willing but screen unnecessary.   Precipitating factors:    Triggers have been: non-existent.   Alleviating factors:    Contact with sponsor has been: no sponsor    Family and support system has been: helpful.   Other Therapies Tried :     Patient has been going to recovery meetings:sporadically.      Patient has been participating in professional counseling/therapy: NO    [place] Board of Pharmacy Data Base Reviewed:    YES; approprate    Pain scores:  Pain intensity on average is 6 on a scale of 0-10.     Side Effects: no side effect    Medications:  Current Outpatient Prescriptions   Medication Sig Dispense Refill     buprenorphine HCl-naloxone HCl (SUBOXONE) 8-2 MG per film Place 1 Film under the tongue daily for 7 days 7 Film 0     levETIRAcetam 1000 MG TABS Take 1,000 mg by mouth 2 times daily 180 tablet 1     diazepam (VALIUM) 5 MG tablet Take 1 tablet (5 mg) by mouth 2 times daily as needed 60 tablet 3     sertraline (ZOLOFT) 100 MG tablet Take 2 tablets (200 mg) by mouth daily 180 tablet 0     carvedilol (COREG) 25 MG tablet Take 1 tablet (25 mg) by mouth 2 times daily (with meals) 180 tablet 1     folic acid (FOLVITE) 1 MG tablet Take 1 tablet (1 mg) by mouth daily 90 tablet 1     hydrochlorothiazide (HYDRODIURIL) 25 MG tablet Take 1 tablet (25 mg) by mouth daily 90 tablet 1     amitriptyline (ELAVIL) 100 MG tablet Take 1 tablet (100 mg) by mouth nightly as needed for sleep       triamcinolone (KENALOG) 0.1 % cream Apply sparingly to affected area three times daily for 14 days. 30 g 0     betamethasone valerate (VALISONE) 0.1 % cream Apply topically 2 times daily  15 g 1       Medical History: any changes in medical history since they were last seen? No        OBJECTIVE:                                                    /87  Pulse 76  Resp 16  Wt 109.8 kg (242 lb)  SpO2 96%  BMI 32.82 kg/m2  Body mass index is 32.82 kg/(m^2).     ROS:  Constitutional, neuro, ENT, endocrine, pulmonary, cardiac, gastrointestinal, genitourinary, musculoskeletal, integument and psychiatric systems are negative, except as otherwise noted.    EXAM:  GENERAL APPEARANCE: healthy, alert and no distress  EYES: Eyes grossly normal to inspection and conjunctivae and sclerae normal  SKIN: no suspicious lesions or rashes  PSYCH: mentation appears normal and affect normal/bright  MENTAL STATUS EXAM:  Appearance/Behavior: No apparent distress and Neatly groomed  Speech: Normal  Mood/Affect: normal affect  Insight: Adequate    Diagnostic Test Results:  No results found for this or any previous visit (from the past 24 hour(s)).         ASSESSMENT:                                                      OPIOID USE DISORDER   ALCOHOL USE DISORDER    ENCOUNTER FOR LONG TERM USE OF HIGH RISK MEDICATION   High Risk Drug Monitoring?  YES   Drug being monitored: Suboxone    Reason for drug: Opioid Use Disorder   What is being monitored?: Dosage, Cravings, Trigger, side effects, and continued abstinence.    CHRONIC LOW BACK PAIN - FAILED BACK SYNDROME  ANXIETY    PLAN:                                                      1. Physical Therapy:  Consider once stable on medication  2. Clinical Health Psychologist to address issues of relaxation, behavioral change, coping style, and other factors important to improvement.  Not at this time  3. Diagnostic Studies:  none  4. Medication Management:  Initiated on 8mg/day - Will INCREASE to 8mg BID today.   5. Further procedures recommended: not at this time  6. Follow up: in 4 weeks, MARCH 24th, 2017        ICD-10-CM    1. Chronic midline low back pain, with sciatica  presence unspecified M54.5     G89.29    2. Generalized anxiety disorder F41.1    3. Encounter for long-term (current) use of high-risk medication Z79.899    4. Opioid use disorder, severe, on maintenance therapy, dependence (H) F11.20    5. Alcohol abuse F10.10        MEDICATIONS:   Orders Placed This Encounter   Medications     buprenorphine HCl-naloxone HCl (SUBOXONE) 8-2 MG per film     Sig: Place 1 Film under the tongue 2 times daily for 28 days     Dispense:  56 Film     Refill:  0          - Continue other medications without change  FUTURE APPOINTMENTS:       - Follow-up visit in 4 weeks, March 24th, 2017    Total time spent was 30 minutes, and more than 50% of face to face time was spent in counseling and/or coordination of care.      [facility]  2/24/2017

## 2017-02-24 NOTE — PATIENT INSTRUCTIONS
1. Make follow up for March 24th, 2017 - 4 weeks from now.     Nurse Triage line:  [phone]   Call this number with any questions or concerns. You may leave a detailed message anytime. Calls are typically returned Monday through Friday between 8 AM and 4:30 PM. We usually get back to you within 2 business days depending on the issue/request.       Medication refills:    For non-narcotic medications, call your pharmacy directly to request a refill. The pharmacy will contact the [facility] for authorization. Please allow 3-4 days for these refills to be processed.     For narcotic refills, call the nurse triage line or send a Social DJ message. Please contact us 7-10 days before your refill is due. The message MUST include the name of the specific medication(s) requested and how you would like to receive the prescription(s). The options are as follows:    Pain Clinic staff can mail the prescription to your pharmacy. Please tell us the name of the pharmacy.    You may pick the prescription up at the Pain Clinic (tell us the location) or during a clinic visit with your pain provider    Pain Clinic staff can deliver the prescription to the [facility] pharmacy in the clinic building. Please tell us the location.      Scheduling number: [phone].  Call this number to schedule or change appointments.    We believe regular attendance is key to your success in our program.    Any time you are unable to keep your appointment we ask that you call us at least 24 hours in advance to let us know. This will allow us to offer the appointment time to another patient.

## 2017-03-24 ENCOUNTER — OFFICE VISIT (OUTPATIENT)
Dept: PALLIATIVE MEDICINE | Facility: CLINIC | Age: 47
End: 2017-03-24
Payer: COMMERCIAL

## 2017-03-24 VITALS
SYSTOLIC BLOOD PRESSURE: 134 MMHG | WEIGHT: 217 LBS | HEART RATE: 63 BPM | BODY MASS INDEX: 29.43 KG/M2 | DIASTOLIC BLOOD PRESSURE: 90 MMHG

## 2017-03-24 DIAGNOSIS — Z79.899 ENCOUNTER FOR LONG-TERM (CURRENT) USE OF HIGH-RISK MEDICATION: ICD-10-CM

## 2017-03-24 DIAGNOSIS — F10.10 ALCOHOL ABUSE: ICD-10-CM

## 2017-03-24 DIAGNOSIS — G89.4 CHRONIC PAIN SYNDROME: ICD-10-CM

## 2017-03-24 DIAGNOSIS — F41.1 GENERALIZED ANXIETY DISORDER: ICD-10-CM

## 2017-03-24 DIAGNOSIS — F11.20 OPIOID USE DISORDER, SEVERE, ON MAINTENANCE THERAPY, DEPENDENCE (H): Primary | ICD-10-CM

## 2017-03-24 PROCEDURE — 99214 OFFICE O/P EST MOD 30 MIN: CPT | Performed by: ANESTHESIOLOGY

## 2017-03-24 RX ORDER — BUPRENORPHINE AND NALOXONE 8; 2 MG/1; MG/1
1 FILM, SOLUBLE BUCCAL; SUBLINGUAL
Qty: 60 FILM | Refills: 0 | Status: SHIPPED | OUTPATIENT
Start: 2017-03-24 | End: 2017-04-21

## 2017-03-24 ASSESSMENT — PAIN SCALES - GENERAL: PAINLEVEL: MODERATE PAIN (5)

## 2017-03-24 NOTE — NURSING NOTE
Torey Coreas presents for follow up pain in his low back.     Initial /90 (BP Location: Right arm, Patient Position: Chair, Cuff Size: Adult Regular)  Pulse 63  Wt 98.4 kg (217 lb)  BMI 29.43 kg/m2 Estimated body mass index is 29.43 kg/(m^2) as calculated from the following:    Height as of 8/26/16: 1.829 m (6').    Weight as of this encounter: 98.4 kg (217 lb)..  BP completed using cuff size: regular     EYAL Shetty, RN-BC  Patient Care Supervisor/Care Coordinator  [facility]

## 2017-03-24 NOTE — MR AVS SNAPSHOT
After Visit Summary   3/24/2017    Torey Coreas    MRN: [id number]           Patient Information     Date Of Birth          1970        Visit Information        Provider Department      3/24/2017 11:00 AM Mikayla Nieves MD [facility]        Today's Diagnoses     Opioid use disorder, severe, on maintenance therapy, dependence (H)    -  1    Generalized anxiety disorder        Alcohol abuse        Chronic pain syndrome        Encounter for long-term (current) use of high-risk medication           Follow-ups after your visit        Your next 10 appointments already scheduled     Apr 21, 2017 11:00 AM CDT   Return Visit with Mikayla Nieves MD   [facility] ([facility])    [address]  [address]  [facility] [address] [phone]              Future tests that were ordered for you today     Open Standing Orders        Priority Remaining Interval Expires Ordered    Urine Drugs of Abuse Screen Panel 13 Routine 100/100  3/24/2018 3/24/2017            Who to contact     If you have questions or need follow up information about today's clinic visit or your schedule please contact [facility] directly at [phone].  Normal or non-critical lab and imaging results will be communicated to you by [employer], letter or phone within 4 business days after the clinic has received the results. If you do not hear from us within 7 days, please contact the clinic through [employer] or phone. If you have a critical or abnormal lab result, we will notify you by phone as soon as possible.  Submit refill requests through [employer] or call your pharmacy and they will forward the refill request to us. Please allow 3 business days for your refill to be completed.          Additional Information About Your Visit        [employer] Information     [employer] gives you secure access to your electronic health record. If you see a primary care provider,  you can also send messages to your care team and make appointments. If you have questions, please call your primary care clinic.  If you do not have a primary care provider, please call [phone] and they will assist you.        Care EveryWhere ID     This is your Care EveryWhere ID. This could be used by other organizations to access your [place] medical records  [id number]        Your Vitals Were     Pulse BMI (Body Mass Index)                63 29.43 kg/m2           Blood Pressure from Last 3 Encounters:   03/24/17 134/90   02/24/17 127/87   02/17/17 110/72    Weight from Last 3 Encounters:   03/24/17 98.4 kg (217 lb)   02/24/17 109.8 kg (242 lb)   01/12/17 99.8 kg (220 lb)                 Where to get your medicines      Some of these will need a paper prescription and others can be bought over the counter.  Ask your nurse if you have questions.     Bring a paper prescription for each of these medications     buprenorphine HCl-naloxone HCl 8-2 MG per film          Primary Care Provider Office Phone # Fax #    Irene PANCHITO Shultz [phone]       [place] [address] [facility] [address]        Goals        General    I want to start CD treatment program this year.  started 11-19-15 (pt-stated)     Notes - Note created  11/19/2015 12:55 PM by Michelle Blanca LSW    As of today's date 11/19/2015 goal is met at 26 - 50%.   Goal Status:  Active   Assessment completed at [facility]          Thank you!     Thank you for choosing [facility]  for your care. Our goal is always to provide you with excellent care. Hearing back from our patients is one way we can continue to improve our services. Please take a few minutes to complete the written survey that you may receive in the mail after your visit with us. Thank you!             Your Updated Medication List - Protect others around you: Learn how to safely use, store and throw away your medicines at  www.disposemymeds.org.          This list is accurate as of: 3/24/17 12:19 PM.  Always use your most recent med list.                   Brand Name Dispense Instructions for use    amitriptyline 100 MG tablet    ELAVIL     Take 1 tablet (100 mg) by mouth nightly as needed for sleep       betamethasone valerate 0.1 % cream    VALISONE    15 g    Apply topically 2 times daily       buprenorphine HCl-naloxone HCl 8-2 MG per film    SUBOXONE    60 Film    Place 1 Film under the tongue 2 times daily       carvedilol 25 MG tablet    COREG    180 tablet    Take 1 tablet (25 mg) by mouth 2 times daily (with meals)       diazepam 5 MG tablet    VALIUM    60 tablet    Take 1 tablet (5 mg) by mouth 2 times daily as needed       folic acid 1 MG tablet    FOLVITE    90 tablet    Take 1 tablet (1 mg) by mouth daily       hydrochlorothiazide 25 MG tablet    HYDRODIURIL    90 tablet    Take 1 tablet (25 mg) by mouth daily       levETIRAcetam 1000 MG Tabs     180 tablet    Take 1,000 mg by mouth 2 times daily       sertraline 100 MG tablet    ZOLOFT    180 tablet    Take 2 tablets (200 mg) by mouth daily       triamcinolone 0.1 % cream    KENALOG    30 g    Apply sparingly to affected area three times daily for 14 days.

## 2017-03-24 NOTE — PROGRESS NOTES
[facility]    Date of visit: 3/24/2017    Chief complaint:   Chief Complaint   Patient presents with     Pain       Interval history:  Torey Coreas is a 46 year old male here for Chronic Pain and Addiction/Suboxone follow up.   Last visit: 2/24/2017    CURRENT DOSE: 8mg BID  BRIEF HPI: The patient is a 46 year old male who was referred to me by his chronic pain provider, Kaye Nelson CNP and his PCP when it became evident that he was abusing alcohol and had an unintentional opioid overdose.  He was on high dose opioids (210 morphine equivalents/day) for 6 years for chronic back pain - failed back syndrome s/p L5-S1 fusion in 2015.   Agreed to detox and eventually was able to discontinue use on his own at home and suboxone was initiated on 2/17/2017.      Recommendations/plan at the last visit included:    1. Physical Therapy:  Consider once stable on medication  2. Clinical Health Psychologist to address issues of relaxation, behavioral change, coping style, and other factors important to improvement.  Not at this time  3. Diagnostic Studies:  none  4. Medication Management:  Initiated on 8mg/day - Will INCREASE to 8mg BID today.   5. Further procedures recommended: not at this time      Since his last visit, Torey Coreas reports:  - Doing well   - pain is under better control with increased dose.   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     Intensity:     There has been: no craving.      Suboxone Dose: adequate.   Progression of Symptoms:     Cues to use and relapse triggers: none    Recovery program has been: active.   Accompanying Signs & Symptoms:    Side Effects: none.    Sobriety:     Status: no use since last visit.      Drug Screen: patient willing but screen unnecessary.   Precipitating  factors:    Triggers have been: non-existent.   Alleviating factors:    Contact with sponsor has been: no sponsor    Family and support system has been: helpful.   Other Therapies Tried :     Patient has been going to recovery meetings:sporadically.      Patient has been participating in professional counseling/therapy: NO    [place] Board of Pharmacy Data Base Reviewed:    YES; approprate    Pain scores:  Pain intensity on average is 5.5 on a scale of 0-10.     Side Effects: no side effect    Medications:  Current Outpatient Prescriptions   Medication Sig Dispense Refill     buprenorphine HCl-naloxone HCl (SUBOXONE) 8-2 MG per film Place 1 Film under the tongue 2 times daily for 28 days 56 Film 0     levETIRAcetam 1000 MG TABS Take 1,000 mg by mouth 2 times daily 180 tablet 1     diazepam (VALIUM) 5 MG tablet Take 1 tablet (5 mg) by mouth 2 times daily as needed 60 tablet 3     sertraline (ZOLOFT) 100 MG tablet Take 2 tablets (200 mg) by mouth daily 180 tablet 0     carvedilol (COREG) 25 MG tablet Take 1 tablet (25 mg) by mouth 2 times daily (with meals) 180 tablet 1     folic acid (FOLVITE) 1 MG tablet Take 1 tablet (1 mg) by mouth daily 90 tablet 1     hydrochlorothiazide (HYDRODIURIL) 25 MG tablet Take 1 tablet (25 mg) by mouth daily 90 tablet 1     amitriptyline (ELAVIL) 100 MG tablet Take 1 tablet (100 mg) by mouth nightly as needed for sleep       triamcinolone (KENALOG) 0.1 % cream Apply sparingly to affected area three times daily for 14 days. 30 g 0     betamethasone valerate (VALISONE) 0.1 % cream Apply topically 2 times daily 15 g 1       Medical History: any changes in medical history since they were last seen? No      OBJECTIVE:                                                    /90 (BP Location: Right arm, Patient Position: Chair, Cuff Size: Adult Regular)  Pulse 63  Wt 98.4 kg (217 lb)  BMI 29.43 kg/m2  Body mass index is 29.43 kg/(m^2).     ROS:  Constitutional, neuro, ENT, endocrine,  pulmonary, cardiac, gastrointestinal, genitourinary, musculoskeletal, integument and psychiatric systems are negative, except as otherwise noted.    EXAM:  GENERAL APPEARANCE: healthy, alert and no distress  EYES: Eyes grossly normal to inspection and conjunctivae and sclerae normal  SKIN: no suspicious lesions or rashes  PSYCH: mentation appears normal and affect normal/bright  MENTAL STATUS EXAM:  Appearance/Behavior: No apparent distress and Neatly groomed  Speech: Normal  Mood/Affect: normal affect  Insight: Adequate    Diagnostic Test Results:  No results found for this or any previous visit (from the past 24 hour(s)).         ASSESSMENT:                                                      OPIOID USE DISORDER   ALCOHOL USE DISORDER    ENCOUNTER FOR LONG TERM USE OF HIGH RISK MEDICATION   High Risk Drug Monitoring?  YES   Drug being monitored: Suboxone    Reason for drug: Opioid Use Disorder   What is being monitored?: Dosage, Cravings, Trigger, side effects, and continued abstinence.    CHRONIC LOW BACK PAIN - FAILED BACK SYNDROME  ANXIETY    PLAN:                                                      6. Physical Therapy:  Consider once stable on medication  7. Clinical Health Psychologist to address issues of relaxation, behavioral change, coping style, and other factors important to improvement.  Not at this time  8. Diagnostic Studies:  none  9. Medication Management:  Initiated on 8mg/day - Will INCREASE to 8mg BID today.   10. Further procedures recommended: not at this time        ICD-10-CM    1. Opioid use disorder, severe, on maintenance therapy, dependence (H) F11.20    2. Generalized anxiety disorder F41.1    3. Alcohol abuse F10.10    4. Chronic pain syndrome G89.4    5. Encounter for long-term (current) use of high-risk medication Z79.899        MEDICATIONS:   Orders Placed This Encounter   Medications     buprenorphine HCl-naloxone HCl (SUBOXONE) 8-2 MG per film     Sig: Place 1 Film under the  tongue 2 times daily     Dispense:  60 Film     Refill:  0          - Continue other medications without change  FUTURE APPOINTMENTS:       - Follow-up visit in 4 weeks, April 21st 2017    Total time spent was 30 minutes, and more than 50% of face to face time was spent in counseling and/or coordination of care.      Mikayla NievesMD Pain Management

## 2017-03-27 ENCOUNTER — ALLIED HEALTH/NURSE VISIT (OUTPATIENT)
Dept: FAMILY MEDICINE | Facility: CLINIC | Age: 47
End: 2017-03-27
Payer: COMMERCIAL

## 2017-03-27 ENCOUNTER — MYC MEDICAL ADVICE (OUTPATIENT)
Dept: FAMILY MEDICINE | Facility: CLINIC | Age: 47
End: 2017-03-27

## 2017-03-27 VITALS
WEIGHT: 217 LBS | BODY MASS INDEX: 29.39 KG/M2 | DIASTOLIC BLOOD PRESSURE: 93 MMHG | RESPIRATION RATE: 14 BRPM | SYSTOLIC BLOOD PRESSURE: 122 MMHG | HEART RATE: 80 BPM | HEIGHT: 72 IN

## 2017-03-27 DIAGNOSIS — Z01.30 BLOOD PRESSURE CHECK: Primary | ICD-10-CM

## 2017-03-27 DIAGNOSIS — Z02.89 HEALTH EXAMINATION OF DEFINED SUBPOPULATION: Primary | ICD-10-CM

## 2017-03-27 PROBLEM — R73.03 PREDIABETES: Status: ACTIVE | Noted: 2017-03-27

## 2017-03-27 LAB
CHOLEST SERPL-MCNC: 251 MG/DL
GLUCOSE SERPL-MCNC: 117 MG/DL (ref 70–99)
HDLC SERPL-MCNC: 51 MG/DL
LDLC SERPL CALC-MCNC: 162 MG/DL
NONHDLC SERPL-MCNC: 200 MG/DL
TRIGL SERPL-MCNC: 190 MG/DL

## 2017-03-27 PROCEDURE — 36415 COLL VENOUS BLD VENIPUNCTURE: CPT | Performed by: FAMILY MEDICINE

## 2017-03-27 PROCEDURE — 80061 LIPID PANEL: CPT | Performed by: FAMILY MEDICINE

## 2017-03-27 PROCEDURE — 99207 ZZC NO CHARGE NURSE ONLY: CPT

## 2017-03-27 PROCEDURE — 82947 ASSAY GLUCOSE BLOOD QUANT: CPT | Performed by: FAMILY MEDICINE

## 2017-03-27 NOTE — MR AVS SNAPSHOT
After Visit Summary   3/27/2017    Torey Coreas    MRN: [id number]           Patient Information     Date Of Birth          1970        Visit Information        Provider Department      3/27/2017 9:45 AM FL SARA FERREIRA/LPN [facility]        Today's Diagnoses     Blood pressure check    -  1       Follow-ups after your visit        Your next 10 appointments already scheduled     Apr 21, 2017 11:00 AM CDT   Return Visit with Mikayla Nieves MD   [facility] ([facility])    [address]  [address]  [facility] [address] [phone]              Who to contact     If you have questions or need follow up information about today's clinic visit or your schedule please contact [facility] directly at [phone].  Normal or non-critical lab and imaging results will be communicated to you by MyChart, letter or phone within 4 business days after the clinic has received the results. If you do not hear from us within 7 days, please contact the clinic through MyChart or phone. If you have a critical or abnormal lab result, we will notify you by phone as soon as possible.  Submit refill requests through [employer] or call your pharmacy and they will forward the refill request to us. Please allow 3 business days for your refill to be completed.          Additional Information About Your Visit        MyChart Information     [employer] gives you secure access to your electronic health record. If you see a primary care provider, you can also send messages to your care team and make appointments. If you have questions, please call your primary care clinic.  If you do not have a primary care provider, please call [phone] and they will assist you.        Care EveryWhere ID     This is your Care EveryWhere ID. This could be used by other organizations to access your [place] medical records  [id number]        Your Vitals Were     Pulse  Respirations Height BMI (Body Mass Index)          80 14 6' (1.829 m) 29.43 kg/m2         Blood Pressure from Last 3 Encounters:   03/27/17 (!) 122/93   03/24/17 134/90   02/24/17 127/87    Weight from Last 3 Encounters:   03/27/17 217 lb (98.4 kg)   03/24/17 217 lb (98.4 kg)   02/24/17 242 lb (109.8 kg)              Today, you had the following     No orders found for display       Primary Care Provider Office Phone # Fax #    Irene ShultzPANCHITO [phone]       [place] [address] [facility] [address]        Goals        General    I want to start CD treatment program this year.  started 11-19-15 (pt-stated)     Notes - Note created  11/19/2015 12:55 PM by Michelle Blanca LSW    As of today's date 11/19/2015 goal is met at 26 - 50%.   Goal Status:  Active   Assessment completed at [facility]          Thank you!     Thank you for choosing [facility]  for your care. Our goal is always to provide you with excellent care. Hearing back from our patients is one way we can continue to improve our services. Please take a few minutes to complete the written survey that you may receive in the mail after your visit with us. Thank you!             Your Updated Medication List - Protect others around you: Learn how to safely use, store and throw away your medicines at www.disposemymeds.org.          This list is accurate as of: 3/27/17 12:23 PM.  Always use your most recent med list.                   Brand Name Dispense Instructions for use    amitriptyline 100 MG tablet    ELAVIL     Take 1 tablet (100 mg) by mouth nightly as needed for sleep       betamethasone valerate 0.1 % cream    VALISONE    15 g    Apply topically 2 times daily       buprenorphine HCl-naloxone HCl 8-2 MG per film    SUBOXONE    60 Film    Place 1 Film under the tongue 2 times daily       carvedilol 25 MG tablet    COREG    180 tablet    Take 1 tablet (25 mg) by mouth 2 times daily (with  meals)       diazepam 5 MG tablet    VALIUM    60 tablet    Take 1 tablet (5 mg) by mouth 2 times daily as needed       folic acid 1 MG tablet    FOLVITE    90 tablet    Take 1 tablet (1 mg) by mouth daily       hydrochlorothiazide 25 MG tablet    HYDRODIURIL    90 tablet    Take 1 tablet (25 mg) by mouth daily       levETIRAcetam 1000 MG Tabs     180 tablet    Take 1,000 mg by mouth 2 times daily       sertraline 100 MG tablet    ZOLOFT    180 tablet    Take 2 tablets (200 mg) by mouth daily       triamcinolone 0.1 % cream    KENALOG    30 g    Apply sparingly to affected area three times daily for 14 days.

## 2017-03-27 NOTE — PROGRESS NOTES
Patient here with form for work for vital signs, waist circumference and BP and lab.  OK'd by Irene Shultz NP to complete the form  Daphnie Vásquez RN

## 2017-04-03 DIAGNOSIS — F33.1 MAJOR DEPRESSIVE DISORDER, RECURRENT EPISODE, MODERATE (H): ICD-10-CM

## 2017-04-04 RX ORDER — SERTRALINE HYDROCHLORIDE 100 MG/1
TABLET, FILM COATED ORAL
Qty: 180 TABLET | Refills: 0 | Status: SHIPPED | OUTPATIENT
Start: 2017-04-04 | End: 2017-07-02

## 2017-04-04 NOTE — TELEPHONE ENCOUNTER
Zoloft     Last Written Prescription Date: 1/6/17  Last Fill Quantity: 180, # refills: 0  Last Office Visit with [facility] primary care provider:  01/12/17   Next 5 appointments (look out 90 days)     Apr 21, 2017 11:00 AM CDT   Return Visit with Mikayla Nieves MD   [facility] ([facility])    [address]  [facility] [address]  [facility] [address] [phone]                   Last PHQ-9 score on record=   PHQ-9 SCORE 10/7/2016   Total Score 4

## 2017-04-21 ENCOUNTER — OFFICE VISIT (OUTPATIENT)
Dept: PALLIATIVE MEDICINE | Facility: CLINIC | Age: 47
End: 2017-04-21
Payer: COMMERCIAL

## 2017-04-21 VITALS — SYSTOLIC BLOOD PRESSURE: 141 MMHG | HEART RATE: 94 BPM | RESPIRATION RATE: 16 BRPM | DIASTOLIC BLOOD PRESSURE: 99 MMHG

## 2017-04-21 DIAGNOSIS — M54.5 CHRONIC MIDLINE LOW BACK PAIN, WITH SCIATICA PRESENCE UNSPECIFIED: Primary | ICD-10-CM

## 2017-04-21 DIAGNOSIS — F41.1 GENERALIZED ANXIETY DISORDER: ICD-10-CM

## 2017-04-21 DIAGNOSIS — G89.29 CHRONIC MIDLINE LOW BACK PAIN, WITH SCIATICA PRESENCE UNSPECIFIED: Primary | ICD-10-CM

## 2017-04-21 DIAGNOSIS — Z79.899 ENCOUNTER FOR LONG-TERM (CURRENT) USE OF HIGH-RISK MEDICATION: ICD-10-CM

## 2017-04-21 DIAGNOSIS — F10.10 ALCOHOL ABUSE: ICD-10-CM

## 2017-04-21 DIAGNOSIS — F11.20 OPIOID USE DISORDER, SEVERE, ON MAINTENANCE THERAPY, DEPENDENCE (H): ICD-10-CM

## 2017-04-21 LAB
AMPHETAMINES UR QL: ABNORMAL NG/ML
BARBITURATES UR QL SCN: ABNORMAL NG/ML
BENZODIAZ UR QL SCN: ABNORMAL NG/ML
BUPRENORPHINE UR QL: ABNORMAL NG/ML
CANNABINOIDS UR QL: ABNORMAL NG/ML
COCAINE UR QL SCN: ABNORMAL NG/ML
D-METHAMPHET UR QL: ABNORMAL NG/ML
METHADONE UR QL SCN: ABNORMAL NG/ML
OPIATES UR QL SCN: ABNORMAL NG/ML
OXYCODONE UR QL SCN: ABNORMAL NG/ML
PCP UR QL SCN: ABNORMAL NG/ML
PROPOXYPH UR QL: ABNORMAL NG/ML
TRICYCLICS UR QL SCN: ABNORMAL NG/ML

## 2017-04-21 PROCEDURE — 99214 OFFICE O/P EST MOD 30 MIN: CPT | Performed by: ANESTHESIOLOGY

## 2017-04-21 PROCEDURE — 80306 DRUG TEST PRSMV INSTRMNT: CPT | Performed by: ANESTHESIOLOGY

## 2017-04-21 RX ORDER — BUPRENORPHINE AND NALOXONE 8; 2 MG/1; MG/1
1 FILM, SOLUBLE BUCCAL; SUBLINGUAL 3 TIMES DAILY
Qty: 90 FILM | Refills: 0 | Status: SHIPPED | OUTPATIENT
Start: 2017-04-21 | End: 2017-05-19

## 2017-04-21 ASSESSMENT — PAIN SCALES - GENERAL: PAINLEVEL: SEVERE PAIN (7)

## 2017-04-21 NOTE — PROGRESS NOTES
"                          [facility]    Date of visit: 4/21/2017    Chief complaint:   Chief Complaint   Patient presents with     Pain     Interval history:  Torey Coreas is a 46 year old male here for Chronic Pain and Addiction/Suboxone follow up.   Last visit: 3/24/2017    CURRENT DOSE: 8mg BID (increased to TID today)  BRIEF HPI: The patient is a 46 year old male who was referred to me by his chronic pain provider, Kaye Nelson CNP and his PCP when it became evident that he was abusing alcohol and had an unintentional opioid overdose.  He was on high dose opioids (210 morphine equivalents/day) for 6 years for chronic back pain - failed back syndrome s/p L5-S1 fusion in 2015. Agreed to detox and eventually was able to discontinue use on his own at home.  Suboxone was initiated on 2/17/2017.      Recommendations/plan at the last visit included:    1. Physical Therapy:  Consider once stable on medication  2. Clinical Health Psychologist to address issues of relaxation, behavioral change, coping style, and other factors important to improvement.  Not at this time  3. Diagnostic Studies:  none  4. Medication Management:  Initiated on 8mg/day - Will INCREASE to 8mg BID today.   5. Further procedures recommended: not at this time      Since his last visit, Torey Coreas reports:  - This past month his pain has been worse.   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   - He would like to go up on his dose.    - going to an AA meeting he likes  - wife likes him better now - mood has stabilized  - He would like a \"warning\" via [employer] about UDS.  I explained that he can expect to do a UDS at every visit.  He states he cannot go and I told him to wait here until he can give a sample.       Status since last visit:    Since last visit patient has been: doing well.     Intensity:     There has been: no craving.      Suboxone Dose: adequate - too low for pain  Progression of " Symptoms:     Cues to use and relapse triggers: none    Recovery program has been: active.   Accompanying Signs & Symptoms:    Side Effects: none.    Sobriety:     Status: no use since last visit.      Drug Screen: Screen will be obtained.    Precipitating factors:    Triggers have been: non-existent.   Alleviating factors:    Contact with sponsor has been: no sponsor    Family and support system has been: helpful.   Other Therapies Tried :     Patient has been going to recovery meetings:sporadically.      Patient has been participating in professional c ounseling/therapy: NO    [place] Board [employer] Pharmacy Data Base Reviewed:    YES; approprate    Pain scores:  Pain intensity on average is 7 on a scale of 0-10.     Side Effects: no side effect    Medications:  Current Outpatient Prescriptions   Medication Sig Dispense Refill     sertraline (ZOLOFT) 100 MG tablet TAKE 2 TABLETS DAILY 180 tablet 0     buprenorphine HCl-naloxone HCl (SUBOXONE) 8-2 MG per film Place 1 Film under the tongue 2 times daily 60 Film 0     levETIRAcetam 1000 MG TABS Take 1,000 mg by mouth 2 times daily 180 tablet 1     diazepam (VALIUM) 5 MG tablet Take 1 tablet (5 mg) by mouth 2 times daily as needed 60 tablet 3     carvedilol (COREG) 25 MG tablet Take 1 tablet (25 mg) by mouth 2 times daily (with meals) 180 tablet 1     folic acid (FOLVITE) 1 MG tablet Take 1 tablet (1 mg) by mouth daily 90 tablet 1     hydrochlorothiazide (HYDRODIURIL) 25 MG tablet Take 1 tablet (25 mg) by mouth daily 90 tablet 1     amitriptyline (ELAVIL) 100 MG tablet Take 1 tablet (100 mg) by mouth nightly as needed for sleep       triamcinolone (KENALOG) 0.1 % cream Apply sparingly to affected area three times daily for 14 days. 30 g 0     betamethasone valerate (VALISONE) 0.1 % cream Apply topically 2 times daily 15 g 1       Medical History: any changes in medical history since they were last seen? No    OBJECTIVE:                                                     BP (!) 141/99 (BP Location: Right arm, Patient Position: Chair, Cuff Size: Adult Regular)  Pulse 94  Resp 16  There is no height or weight on file to calculate BMI.     ROS:  Constitutional, neuro, ENT, endocrine, pulmonary, cardiac, gastrointestinal, genitourinary, musculoskeletal, integument and psychiatric systems are negative, except as otherwise noted.    EXAM:  GENERAL APPEARANCE: healthy, alert and no distress  EYES: Eyes grossly normal to inspection and conjunctivae and sclerae normal  SKIN: no suspicious lesions or rashes  PSYCH: mentation appears normal and affect normal/bright  MENTAL STATUS EXAM:  Appearance/Behavior: No apparent distress and Neatly groomed  Speech: Normal  Mood/Affect: normal affect  Insight: Adequate    Diagnostic Test Results:  No results found for this or any previous visit (from the past 24 hour(s)).         ASSESSMENT:                                                      OPIOID USE DISORDER   ALCOHOL USE DISORDER    ENCOUNTER FOR LONG TERM USE OF HIGH RISK MEDICATION   High Risk Drug Monitoring?  YES   Drug being monitored: Suboxone    Reason for drug: Opioid Use Disorder   What is being monitored?: Dosage, Cravings, Trigger, side effects, and continued abstinence.    CHRONIC LOW BACK PAIN - FAILED BACK SYNDROME  ANXIETY    PLAN:                                                      6. Physical Therapy:  Consider once stable on medication  7. Clinical Health Psychologist to address issues of relaxation, behavioral change, coping style, and other factors important to improvement.  Not at this time  8. Diagnostic Studies:  none  9. Medication Management:  Initiated on 8mg/day - Will INCREASE to 8mg TID today.   10. Further procedures recommended: not at this time        ICD-10-CM    1. Chronic midline low back pain, with sciatica presence unspecified M54.5     G89.29    2. Opioid use disorder, severe, on maintenance therapy, dependence (H) F11.20    3. Generalized anxiety disorder  F41.1    4. Alcohol abuse F10.10    5. Encounter for long-term (current) use of high-risk medication Z79.899        MEDICATIONS:   Orders Placed This Encounter   Medications     buprenorphine HCl-naloxone HCl (SUBOXONE) 8-2 MG per film     Sig: Place 1 Film under the tongue 3 times daily     Dispense:  90 Film     Refill:  0          - Continue other medications without change  FUTURE APPOINTMENTS:       - Follow-up visit in 4 weeks    Total time spent was 30 minutes, and more than 50% of face to face time was spent in counseling and/or coordination of care.      Mikayla Warren

## 2017-04-21 NOTE — NURSING NOTE
Chief Complaint   Patient presents with     Pain       Initial BP (!) 141/99 (BP Location: Right arm, Patient Position: Chair, Cuff Size: Adult Regular)  Pulse 94  Resp 16 Estimated body mass index is 29.43 kg/(m^2) as calculated from the following:    Height as of 3/27/17: 1.829 m (6').    Weight as of 3/27/17: 98.4 kg (217 lb).  Medication Reconciliation: complete       Eloy Hanna MA  [facility]

## 2017-04-21 NOTE — MR AVS SNAPSHOT
After Visit Summary   4/21/2017    Torey Coreas    MRN: [id number]           Patient Information     Date Of Birth          1970        Visit Information        Provider Department      4/21/2017 11:00 AM Mikayla Nieves MD [facility]        Today's Diagnoses     Chronic midline low back pain, with sciatica presence unspecified    -  1    Opioid use disorder, severe, on maintenance therapy, dependence (H)        Generalized anxiety disorder        Alcohol abuse        Encounter for long-term (current) use of high-risk medication          Care Instructions    Follow up in 4 weeks    Mikayla Nieves MD         Follow-ups after your visit        Your next 10 appointments already scheduled     May 19, 2017 11:00 AM CDT   Return Visit with Mikayla Nieves MD   [facility] ([facility])    [address] [place] [id number] [phone]              Who to contact     If you have questions or need follow up information about today's clinic visit or your schedule please contact [facility] directly at [phone].  Normal or non-critical lab and imaging results will be communicated to you by K121hart, letter or phone within 4 business days after the clinic has received the results. If you do not hear from us within 7 days, please contact the clinic through K[employer]hart or phone. If you have a critical or abnormal lab result, we will notify you by phone as soon as possible.  Submit refill requests through [employer] or call your pharmacy and they will forward the refill request to us. Please allow 3 business days for your refill to be completed.          Additional Information About Your Visit        K121hart Information     [employer] gives you secure access to your electronic health record. If you see a primary care provider, you can also send messages to your care team and make appointments. If you have questions, please  call your primary care clinic.  If you do not have a primary care provider, please call [phone] and they will assist you.        Care EveryWhere ID     This is your Care EveryWhere ID. This could be used by other organizations to access your [facility] medical records  [id number]        Your Vitals Were     Pulse Respirations                94 16           Blood Pressure from Last 3 Encounters:   04/21/17 (!) 141/99   03/27/17 (!) 122/93   03/24/17 134/90    Weight from Last 3 Encounters:   03/27/17 98.4 kg (217 lb)   03/24/17 98.4 kg (217 lb)   02/24/17 109.8 kg (242 lb)              Today, you had the following     No orders found for display         Today's Medication Changes          These changes are accurate as of: 4/21/17 12:49 PM.  If you have any questions, ask your nurse or doctor.               These medicines have changed or have updated prescriptions.        Dose/Directions    buprenorphine HCl-naloxone HCl 8-2 MG per film   Commonly known as:  SUBOXONE   This may have changed:  when to take this   Used for:  Opioid use disorder, severe, on maintenance therapy, dependence (H)   Changed by:  Mikayla Nieves MD        Dose:  1 Film   Place 1 Film under the tongue 3 times daily   Quantity:  90 Film   Refills:  0            Where to get your medicines      Some of these will need a paper prescription and others can be bought over the counter.  Ask your nurse if you have questions.     Bring a paper prescription for each of these medications     buprenorphine HCl-naloxone HCl 8-2 MG per film                Primary Care Provider Office Phone # Fax #    Irene Shultz [phone]       [facility] [address] [facility] [address]        Goals        General    I want to start CD treatment program this year.  started 11-19-15 (pt-stated)     Notes - Note created  11/19/2015 12:55 PM by Michelle Blanca LSW    As of today's date 11/19/2015 goal is met at 26 - 50%.    Goal Status:  Active   Assessment completed at "[employer]          Thank you!     Thank you for choosing [facility]  for your care. Our goal is always to provide you with excellent care. Hearing back from our patients is one way we can continue to improve our services. Please take a few minutes to complete the written survey that you may receive in the mail after your visit with us. Thank you!             Your Updated Medication List - Protect others around you: Learn how to safely use, store and throw away your medicines at www.disposemymeds.org.          This list is accurate as of: 4/21/17 12:49 PM.  Always use your most recent med list.                   Brand Name Dispense Instructions for use    amitriptyline 100 MG tablet    ELAVIL     Take 1 tablet (100 mg) by mouth nightly as needed for sleep       betamethasone valerate 0.1 % cream    VALISONE    15 g    Apply topically 2 times daily       buprenorphine HCl-naloxone HCl 8-2 MG per film    SUBOXONE    90 Film    Place 1 Film under the tongue 3 times daily       carvedilol 25 MG tablet    COREG    180 tablet    Take 1 tablet (25 mg) by mouth 2 times daily (with meals)       diazepam 5 MG tablet    VALIUM    60 tablet    Take 1 tablet (5 mg) by mouth 2 times daily as needed       folic acid 1 MG tablet    FOLVITE    90 tablet    Take 1 tablet (1 mg) by mouth daily       hydrochlorothiazide 25 MG tablet    HYDRODIURIL    90 tablet    Take 1 tablet (25 mg) by mouth daily       levETIRAcetam 1000 MG Tabs     180 tablet    Take 1,000 mg by mouth 2 times daily       sertraline 100 MG tablet    ZOLOFT    180 tablet    TAKE 2 TABLETS DAILY       triamcinolone 0.1 % cream    KENALOG    30 g    Apply sparingly to affected area three times daily for 14 days.

## 2017-05-18 NOTE — PROGRESS NOTES
"[facility]    Date of visit: 5/19/2017    Chief complaint:   Chief Complaint   Patient presents with     Pain     Interval history:  Torey Coreas is a 46 year old male here for Chronic Pain and Addiction/Suboxone follow up.   Last visit: 4/21/2017    CURRENT DOSE: 8mg TID  BRIEF HPI: The patient is a 46 year old male who was referred to me by his chronic pain provider, Kaye Nelson CNP and his PCP when it became evident that he was abusing alcohol and had an unintentional opioid overdose.  He was on high dose opioids (210 morphine equivalents/day) for 6 years for chronic back pain - failed back syndrome s/p L5-S1 fusion in 2015. Agreed to detox and eventually was able to discontinue use on his own at home.  Suboxone was initiated on 2/17/2017.      Recommendations/plan at the last visit included:    1. Physical Therapy:  Consider once stable on medication  2. Clinical Health Psychologist to address issues of relaxation, behavioral change, coping style, and other factors important to improvement.  Not at this time  3. Diagnostic Studies:  none  4. Medication Management:  Initiated on 8mg/day - Will INCREASE to 8mg BID today.   5. 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   - Last UTOX positive for benzos - he states he has PRN valium from his primary   - States he has times where he really wants to reach for a pill when his pain gets out of control. Recommended he cut a strip in half and save for PRN use.    - going to an AA meeting he likes  - wife likes him better now - mood has stabilized  - He would like a \"warning\" via [employer] about UDS. We did not do a UDS today so told him he can expect to have one done at the next visit.   - Asked for me to fax his " prescription down to the pharmacy prior to the visit as he is in a hurry.  I agreed to do this for him.        Status since last visit:    Since last visit patient has been: doing well.     Intensity:     There has been: no craving.      Suboxone Dose: adequate   Progression of Symptoms:     Cues to use and relapse triggers: none    Recovery program has been: active.   Accompanying Signs & Symptoms:    Side Effects: none.    Sobriety:     Status: no use since last visit.      Drug Screen: Not obtained at this visit.    Precipitating factors:    Triggers have been: non-existent.   Alleviating factors:    Contact with sponsor has been: no sponsor    Family and support system has been: helpful.   Other Therapies Tried :     Patient has been going to recovery meetings:sporadically.      Patient has been participating in professional counseling/therapy: NO    [place] Board of Pharmacy Data Base Reviewed:    YES; approprate    Pain scores:  Pain intensity on average is 6.5 on a scale of 0-10.     Side Effects: no side effect    Medications:  Current Outpatient Prescriptions   Medication Sig Dispense Refill     buprenorphine HCl-naloxone HCl (SUBOXONE) 8-2 MG per film Place 1 Film under the tongue 3 times daily 90 Film 0     sertraline (ZOLOFT) 100 MG tablet TAKE 2 TABLETS DAILY 180 tablet 0     levETIRAcetam 1000 MG TABS Take 1,000 mg by mouth 2 times daily 180 tablet 1     diazepam (VALIUM) 5 MG tablet Take 1 tablet (5 mg) by mouth 2 times daily as needed 60 tablet 3     carvedilol (COREG) 25 MG tablet Take 1 tablet (25 mg) by mouth 2 times daily (with meals) 180 tablet 1     folic acid (FOLVITE) 1 MG tablet Take 1 tablet (1 mg) by mouth daily 90 tablet 1     hydrochlorothiazide (HYDRODIURIL) 25 MG tablet Take 1 tablet (25 mg) by mouth daily 90 tablet 1     amitriptyline (ELAVIL) 100 MG tablet Take 1 tablet (100 mg) by mouth nightly as needed for sleep       triamcinolone (KENALOG) 0.1 % cream Apply sparingly to  affected area three times daily for 14 days. 30 g 0     betamethasone valerate (VALISONE) 0.1 % cream Apply topically 2 times daily 15 g 1       Medical History: any changes in medical history since they were last seen? No    OBJECTIVE:                                                    /88 (BP Location: Right arm, Patient Position: Chair, Cuff Size: Adult Large)  Pulse 67  Resp 16  Wt 98.4 kg (217 lb)  BMI 29.43 kg/m2  Body mass index is 29.43 kg/(m^2).     ROS:  Constitutional, neuro, ENT, endocrine, pulmonary, cardiac, gastrointestinal, genitourinary, musculoskeletal, integument and psychiatric systems are negative, except as otherwise noted.    EXAM:  GENERAL APPEARANCE: healthy, alert and no distress  EYES: Eyes grossly normal to inspection and conjunctivae and sclerae normal  SKIN: no suspicious lesions or rashes  PSYCH: mentation appears normal and affect normal/bright  MENTAL STATUS EXAM:  Appearance/Behavior: No apparent distress and Neatly groomed  Speech: Normal  Mood/Affect: normal affect  Insight: Adequate    Diagnostic Test Results:  No results found for this or any previous visit (from the past 24 hour(s)).         ASSESSMENT:                                                      OPIOID USE DISORDER   ALCOHOL USE DISORDER    ENCOUNTER FOR LONG TERM USE OF HIGH RISK MEDICATION   High Risk Drug Monitoring?  YES   Drug being monitored: Suboxone    Reason for drug: Opioid Use Disorder   What is being monitored?: Dosage, Cravings, Trigger, side effects, and continued abstinence.    CHRONIC LOW BACK PAIN - FAILED BACK SYNDROME  ANXIETY    PLAN:                                                      6. Physical Therapy:  Recommend but he is unwilling at this time   7. Clinical Health Psychologist to address issues of relaxation, behavioral change, coping style, and other factors important to improvement.  Not at this time  8. Diagnostic Studies:  none  9. Medication Management:  Doing well on 8mg TID.    10. Further procedures recommended: not at this time        ICD-10-CM    1. Encounter for long-term (current) use of high-risk medication Z79.899    2. Chronic midline low back pain, with sciatica presence unspecified M54.5     G89.29    3. Opioid use disorder, severe, on maintenance therapy, dependence (H) F11.20 buprenorphine HCl-naloxone HCl (SUBOXONE) 8-2 MG per film   4. Alcohol abuse F10.10        MEDICATIONS:   Orders Placed This Encounter   Medications     buprenorphine HCl-naloxone HCl (SUBOXONE) 8-2 MG per film     Sig: Place 1 Film under the tongue 3 times daily     Dispense:  90 Film     Refill:  0          - Continue other medications without change  FUTURE APPOINTMENTS:       - Follow-up visit in 4 weeks    Total time spent was 20 minutes, and more than 50% of face to face time was spent in counseling and/or coordination of care.      Mikayla Warren

## 2017-05-19 ENCOUNTER — OFFICE VISIT (OUTPATIENT)
Dept: PALLIATIVE MEDICINE | Facility: CLINIC | Age: 47
End: 2017-05-19
Payer: COMMERCIAL

## 2017-05-19 VITALS
BODY MASS INDEX: 29.43 KG/M2 | HEART RATE: 67 BPM | SYSTOLIC BLOOD PRESSURE: 129 MMHG | WEIGHT: 217 LBS | RESPIRATION RATE: 16 BRPM | DIASTOLIC BLOOD PRESSURE: 88 MMHG

## 2017-05-19 DIAGNOSIS — Z79.899 ENCOUNTER FOR LONG-TERM (CURRENT) USE OF HIGH-RISK MEDICATION: ICD-10-CM

## 2017-05-19 DIAGNOSIS — F11.20 OPIOID USE DISORDER, SEVERE, ON MAINTENANCE THERAPY, DEPENDENCE (H): Primary | ICD-10-CM

## 2017-05-19 DIAGNOSIS — M54.5 CHRONIC MIDLINE LOW BACK PAIN, WITH SCIATICA PRESENCE UNSPECIFIED: ICD-10-CM

## 2017-05-19 DIAGNOSIS — F10.10 ALCOHOL ABUSE: ICD-10-CM

## 2017-05-19 DIAGNOSIS — G89.29 CHRONIC MIDLINE LOW BACK PAIN, WITH SCIATICA PRESENCE UNSPECIFIED: ICD-10-CM

## 2017-05-19 PROCEDURE — 99214 OFFICE O/P EST MOD 30 MIN: CPT | Performed by: ANESTHESIOLOGY

## 2017-05-19 RX ORDER — BUPRENORPHINE AND NALOXONE 8; 2 MG/1; MG/1
1 FILM, SOLUBLE BUCCAL; SUBLINGUAL 3 TIMES DAILY
Qty: 90 FILM | Refills: 0 | Status: SHIPPED | OUTPATIENT
Start: 2017-05-19 | End: 2017-06-16

## 2017-05-19 ASSESSMENT — PAIN SCALES - GENERAL: PAINLEVEL: SEVERE PAIN (6)

## 2017-05-19 NOTE — MR AVS SNAPSHOT
After Visit Summary   5/19/2017    Torey Coreas    MRN: [id number]           Patient Information     Date Of Birth          1970        Visit Information        Provider Department      5/19/2017 11:00 AM Mikayla Nieves MD [facility]        Today's Diagnoses     Opioid use disorder, severe, on maintenance therapy, dependence (H)    -  1    Encounter for long-term (current) use of high-risk medication        Chronic midline low back pain, with sciatica presence unspecified        Alcohol abuse          Care Instructions        ----------------------------------------------------------------  Nurse Triage line:  [phone]   Call this number with any questions or concerns. You may leave a detailed message anytime. Calls are typically returned Monday through Friday between 8 AM and 4:30 PM. We usually get back to you within 2 business days depending on the issue/request.       Medication refills:    For non-narcotic medications, call your pharmacy directly to request a refill. The pharmacy will contact the [facility] for authorization. Please allow 3-4 days for these refills to be processed.     For narcotic refills, call the nurse triage line or send a [employer] message. Please contact us 7-10 days before your refill is due. The message MUST include the name of the specific medication(s) requested and how you would like to receive the prescription(s). The options are as follows:    Pain Clinic staff can mail the prescription to your pharmacy. Please tell us the name of the pharmacy.    You may pick the prescription up at the Pain Clinic (tell us the location) or during a clinic visit with your pain provider    Pain Clinic staff can deliver the prescription to the [facility] pharmacy in the clinic building. Please tell us the location.      Scheduling number: [phone].  Call this number to schedule or change appointments.    We believe regular attendance is  key to your success in our program.    Any time you are unable to keep your appointment we ask that you call us at least 24 hours in advance to let us know. This will allow us to offer the appointment time to another patient.             Follow-ups after your visit        Your next 10 appointments already scheduled     Jun 16, 2017 11:00 AM CDT   Return Visit with Mikayla Nieves MD   [facility] ([facility])    [address]  [address] [address] [phone]              Who to contact     If you have questions or need follow up information about today's clinic visit or your schedule please contact [facility] directly at [phone].  Normal or non-critical lab and imaging results will be communicated to you by MyChart, letter or phone within 4 business days after the clinic has received the results. If you do not hear from us within 7 days, please contact the clinic through Social Reward[employer]art or phone. If you have a critical or abnormal lab result, we will notify you by phone as soon as possible.  Submit refill requests through C[employer]Square or call your pharmacy and they will forward the refill request to us. Please allow 3 business days for your refill to be completed.          Additional Information About Your Visit        Social RewardsharAnaergia Information     [employer] gives you secure access to your electronic health record. If you see a primary care provider, you can also send messages to your care team and make appointments. If you have questions, please call your primary care clinic.  If you do not have a primary care provider, please call [phone] and they will assist you.        Care EveryWhere ID     This is your Care EveryWhere ID. This could be used by other organizations to access your [facility] medical records  [id number]        Your Vitals Were     Pulse Respirations BMI (Body Mass Index)             67 16 29.43 kg/m2          Blood Pressure from Last 3  Encounters:   05/19/17 129/88   04/21/17 (!) 141/99   03/27/17 (!) 122/93    Weight from Last 3 Encounters:   05/19/17 98.4 kg (217 lb)   03/27/17 98.4 kg (217 lb)   03/24/17 98.4 kg (217 lb)              Today, you had the following     No orders found for display         Where to get your medicines      Some of these will need a paper prescription and others can be bought over the counter.  Ask your nurse if you have questions.     Bring a paper prescription for each of these medications     buprenorphine HCl-naloxone HCl 8-2 MG per film          Primary Care Provider Office Phone # Fax #    Irene Shultz, [phone]       [address] [address]        Goals        General    I want to start CD treatment program this year.  started 11-19-15 (pt-stated)     Notes - Note created  11/19/2015 12:55 PM by Michelle Blanca LSW    As of today's date 11/19/2015 goal is met at 26 - 50%.   Goal Status:  Active   Assessment completed at [facility]          Thank you!     Thank you for choosing [facility]  for your care. Our goal is always to provide you with excellent care. Hearing back from our patients is one way we can continue to improve our services. Please take a few minutes to complete the written survey that you may receive in the mail after your visit with us. Thank you!             Your Updated Medication List - Protect others around you: Learn how to safely use, store and throw away your medicines at www.disposemymeds.org.          This list is accurate as of: 5/19/17 11:29 AM.  Always use your most recent med list.                   Brand Name Dispense Instructions for use    amitriptyline 100 MG tablet    ELAVIL     Take 1 tablet (100 mg) by mouth nightly as needed for sleep       betamethasone valerate 0.1 % cream    VALISONE    15 g    Apply topically 2 times daily       buprenorphine HCl-naloxone HCl 8-2 MG per film     SUBOXONE    90 Film    Place 1 Film under the tongue 3 times daily       carvedilol 25 MG tablet    COREG    180 tablet    Take 1 tablet (25 mg) by mouth 2 times daily (with meals)       diazepam 5 MG tablet    VALIUM    60 tablet    Take 1 tablet (5 mg) by mouth 2 times daily as needed       folic acid 1 MG tablet    FOLVITE    90 tablet    Take 1 tablet (1 mg) by mouth daily       hydrochlorothiazide 25 MG tablet    HYDRODIURIL    90 tablet    Take 1 tablet (25 mg) by mouth daily       levETIRAcetam 1000 MG Tabs     180 tablet    Take 1,000 mg by mouth 2 times daily       sertraline 100 MG tablet    ZOLOFT    180 tablet    TAKE 2 TABLETS DAILY       triamcinolone 0.1 % cream    KENALOG    30 g    Apply sparingly to affected area three times daily for 14 days.

## 2017-05-19 NOTE — PATIENT INSTRUCTIONS
----------------------------------------------------------------  Nurse Triage line:  [phone]   Call this number with any questions or concerns. You may leave a detailed message anytime. Calls are typically returned Monday through Friday between 8 AM and 4:30 PM. We usually get back to you within 2 business days depending on the issue/request.       Medication refills:    For non-narcotic medications, call your pharmacy directly to request a refill. The pharmacy will contact the [facility] for authorization. Please allow 3-4 days for these refills to be processed.     For narcotic refills, call the nurse triage line or send a [employer] message. Please contact us 7-10 days before your refill is due. The message MUST include the name of the specific medication(s) requested and how you would like to receive the prescription(s). The options are as follows:    Pain Clinic staff can mail the prescription to your pharmacy. Please tell us the name of the pharmacy.    You may pick the prescription up at the Pain Clinic (tell us the location) or during a clinic visit with your pain provider    Pain Clinic staff can deliver the prescription to the [facility] pharmacy in the clinic building. Please tell us the location.      Scheduling number: [phone].  Call this number to schedule or change appointments.    We believe regular attendance is key to your success in our program.    Any time you are unable to keep your appointment we ask that you call us at least 24 hours in advance to let us know. This will allow us to offer the appointment time to another patient.

## 2017-05-19 NOTE — NURSING NOTE
Chief Complaint   Patient presents with     Pain       Initial /88 (BP Location: Right arm, Patient Position: Chair, Cuff Size: Adult Large)  Pulse 67  Resp 16  Wt 98.4 kg (217 lb)  BMI 29.43 kg/m2 Estimated body mass index is 29.43 kg/(m^2) as calculated from the following:    Height as of 3/27/17: 1.829 m (6').    Weight as of this encounter: 98.4 kg (217 lb).  Medication Reconciliation: complete       Eloy Hanna MA  [facility]

## 2017-05-24 ENCOUNTER — ALLIED HEALTH/NURSE VISIT (OUTPATIENT)
Dept: FAMILY MEDICINE | Facility: CLINIC | Age: 47
End: 2017-05-24
Payer: COMMERCIAL

## 2017-05-24 VITALS — SYSTOLIC BLOOD PRESSURE: 129 MMHG | DIASTOLIC BLOOD PRESSURE: 88 MMHG

## 2017-05-24 DIAGNOSIS — I10 HTN, GOAL BELOW 140/90: Primary | ICD-10-CM

## 2017-05-24 PROCEDURE — 99207 ZZC NO CHARGE NURSE ONLY: CPT | Performed by: NURSE PRACTITIONER

## 2017-06-16 ENCOUNTER — TELEPHONE (OUTPATIENT)
Dept: PALLIATIVE MEDICINE | Facility: CLINIC | Age: 47
End: 2017-06-16

## 2017-06-16 ENCOUNTER — OFFICE VISIT (OUTPATIENT)
Dept: PALLIATIVE MEDICINE | Facility: CLINIC | Age: 47
End: 2017-06-16
Payer: COMMERCIAL

## 2017-06-16 VITALS
DIASTOLIC BLOOD PRESSURE: 90 MMHG | RESPIRATION RATE: 16 BRPM | OXYGEN SATURATION: 97 % | HEART RATE: 59 BPM | SYSTOLIC BLOOD PRESSURE: 130 MMHG

## 2017-06-16 DIAGNOSIS — F11.20 OPIOID USE DISORDER, SEVERE, ON MAINTENANCE THERAPY, DEPENDENCE (H): ICD-10-CM

## 2017-06-16 DIAGNOSIS — M54.5 CHRONIC MIDLINE LOW BACK PAIN, WITH SCIATICA PRESENCE UNSPECIFIED: Primary | ICD-10-CM

## 2017-06-16 DIAGNOSIS — G89.29 CHRONIC MIDLINE LOW BACK PAIN, WITH SCIATICA PRESENCE UNSPECIFIED: Primary | ICD-10-CM

## 2017-06-16 DIAGNOSIS — F10.10 ALCOHOL ABUSE: ICD-10-CM

## 2017-06-16 DIAGNOSIS — Z79.899 ENCOUNTER FOR LONG-TERM (CURRENT) USE OF HIGH-RISK MEDICATION: ICD-10-CM

## 2017-06-16 DIAGNOSIS — F41.1 GENERALIZED ANXIETY DISORDER: ICD-10-CM

## 2017-06-16 PROCEDURE — 99213 OFFICE O/P EST LOW 20 MIN: CPT | Performed by: ANESTHESIOLOGY

## 2017-06-16 RX ORDER — BUPRENORPHINE AND NALOXONE 8; 2 MG/1; MG/1
1 FILM, SOLUBLE BUCCAL; SUBLINGUAL 3 TIMES DAILY
Qty: 90 FILM | Refills: 1 | Status: SHIPPED | OUTPATIENT
Start: 2017-06-19 | End: 2017-08-09

## 2017-06-16 ASSESSMENT — PAIN SCALES - GENERAL: PAINLEVEL: SEVERE PAIN (7)

## 2017-06-16 NOTE — MR AVS SNAPSHOT
After Visit Summary   6/16/2017    Torey Coreas    MRN: [id number]           Patient Information     Date Of Birth          1970        Visit Information        Provider Department      6/16/2017 11:00 AM Mikayla Nieves MD [facility]        Today's Diagnoses     Opioid use disorder, severe, on maintenance therapy, dependence (H)          Care Instructions    1. Follow up second week of August.  We will do a urine drug screen at that visit.     Mikayla Nieves MD     ----------------------------------------------------------------  Nurse Triage line:  [phone]   Call this number with any questions or concerns. You may leave a detailed message anytime. Calls are typically returned Monday through Friday between 8 AM and 4:30 PM. We usually get back to you within 2 business days depending on the issue/request.       Medication refills:    For non-narcotic medications, call your pharmacy directly to request a refill. The pharmacy will contact the [facility] for authorization. Please allow 3-4 days for these refills to be processed.     For narcotic refills, call the nurse triage line or send a [employer] message. Please contact us 7-10 days before your refill is due. The message MUST include the name of the specific medication(s) requested and how you would like to receive the prescription(s). The options are as follows:    Pain Clinic staff can mail the prescription to your pharmacy. Please tell us the name of the pharmacy.    You may pick the prescription up at the Pain Clinic (tell us the location) or during a clinic visit with your pain provider    Pain Clinic staff can deliver the prescription to the [facility] pharmacy in the clinic building. Please tell us the location.      Scheduling number: [phone].  Call this number to schedule or change appointments.    We believe regular attendance is key to your success in our program.    Any time you are unable to  keep your appointment we ask that you call us at least 24 hours in advance to let us know. This will allow us to offer the appointment time to another patient.               Follow-ups after your visit        Who to contact     If you have questions or need follow up information about today's clinic visit or your schedule please contact [facility] directly at [phone].  Normal or non-critical lab and imaging results will be communicated to you by Code Bluehart, letter or phone within 4 business days after the clinic has received the results. If you do not hear from us within 7 days, please contact the clinic through Greenstackt or phone. If you have a critical or abnormal lab result, we will notify you by phone as soon as possible.  Submit refill requests through SafetyWeb or call your pharmacy and they will forward the refill request to us. Please allow 3 business days for your refill to be completed.          Additional Information About Your Visit        Code BlueharSun-eee Information     [employer] gives you secure access to your electronic health record. If you see a primary care provider, you can also send messages to your care team and make appointments. If you have questions, please call your primary care clinic.  If you do not have a primary care provider, please call [phone] and they will assist you.        Care EveryWhere ID     This is your Care EveryWhere ID. This could be used by other organizations to access your [facility] medical records  [id number]        Your Vitals Were     Pulse Respirations Pulse Oximetry             59 16 97%          Blood Pressure from Last 3 Encounters:   06/16/17 130/90   05/24/17 129/88   05/19/17 129/88    Weight from Last 3 Encounters:   05/19/17 98.4 kg (217 lb)   03/27/17 98.4 kg (217 lb)   03/24/17 98.4 kg (217 lb)              Today, you had the following     No orders found for display         Where to get your medicines      Some of these will need a paper  prescription and others can be bought over the counter.  Ask your nurse if you have questions.     Bring a paper prescription for each of these medications     buprenorphine HCl-naloxone HCl 8-2 MG per film          Primary Care Provider Office Phone # Fax #    Irene Shultz [phone]       [facility] [address] [facility] [address]        Goals        General    I want to start CD treatment program this year.  started 11-19-15 (pt-stated)     Notes - Note created  11/19/2015 12:55 PM by Michelle Blanca LSW    As of today's date 11/19/2015 goal is met at 26 - 50%.   Goal Status:  Active   Assessment completed at [facility]          Thank you!     Thank you for choosing [facility]  for your care. Our goal is always to provide you with excellent care. Hearing back from our patients is one way we can continue to improve our services. Please take a few minutes to complete the written survey that you may receive in the mail after your visit with us. Thank you!             Your Updated Medication List - Protect others around you: Learn how to safely use, store and throw away your medicines at www.disposemymeds.org.          This list is accurate as of: 6/16/17 11:06 AM.  Always use your most recent med list.                   Brand Name Dispense Instructions for use    amitriptyline 100 MG tablet    ELAVIL     Take 1 tablet (100 mg) by mouth nightly as needed for sleep       betamethasone valerate 0.1 % cream    VALISONE    15 g    Apply topically 2 times daily       buprenorphine HCl-naloxone HCl 8-2 MG per film   Start taking on:  6/19/2017    SUBOXONE    90 Film    Place 1 Film under the tongue 3 times daily       carvedilol 25 MG tablet    COREG    180 tablet    Take 1 tablet (25 mg) by mouth 2 times daily (with meals)       diazepam 5 MG tablet    VALIUM    60 tablet    Take 1 tablet (5 mg) by mouth 2 times daily as needed       folic acid 1 MG  tablet    FOLVITE    90 tablet    Take 1 tablet (1 mg) by mouth daily       hydrochlorothiazide 25 MG tablet    HYDRODIURIL    90 tablet    Take 1 tablet (25 mg) by mouth daily       levETIRAcetam 1000 MG Tabs     180 tablet    Take 1,000 mg by mouth 2 times daily       sertraline 100 MG tablet    ZOLOFT    180 tablet    TAKE 2 TABLETS DAILY       triamcinolone 0.1 % cream    KENALOG    30 g    Apply sparingly to affected area three times daily for 14 days.

## 2017-06-16 NOTE — PATIENT INSTRUCTIONS
1. Follow up second week of August.  We will do a urine drug screen at that visit.     Mikayla Nieves MD     ----------------------------------------------------------------  Nurse Triage line:  [phone]   Call this number with any questions or concerns. You may leave a detailed message anytime. Calls are typically returned Monday through Friday between 8 AM and 4:30 PM. We usually get back to you within 2 business days depending on the issue/request.       Medication refills:    For non-narcotic medications, call your pharmacy directly to request a refill. The pharmacy will contact the [facility] for authorization. Please allow 3-4 days for these refills to be processed.     For narcotic refills, call the nurse triage line or send a [employer] message. Please contact us 7-10 days before your refill is due. The message MUST include the name of the specific medication(s) requested and how you would like to receive the prescription(s). The options are as follows:    Pain Clinic staff can mail the prescription to your pharmacy. Please tell us the name of the pharmacy.    You may pick the prescription up at the Pain Clinic (tell us the location) or during a clinic visit with your pain provider    Pain Clinic staff can deliver the prescription to the [facility] pharmacy in the clinic building. Please tell us the location.      Scheduling number: [phone].  Call this number to schedule or change appointments.    We believe regular attendance is key to your success in our program.    Any time you are unable to keep your appointment we ask that you call us at least 24 hours in advance to let us know. This will allow us to offer the appointment time to another patient.

## 2017-06-16 NOTE — PROGRESS NOTES
[facility]    Date of visit: 6/16/2017    Chief complaint:   Chief Complaint   Patient presents with     Pain     Interval history:  Torey Coreas is a 46 year old male here for Chronic Pain and Addiction/Suboxone follow up.   Last visit: 5/19/2017    CURRENT DOSE: 8mg TID  BRIEF HPI: The patient is a 46 year old male who was referred to me by his chronic pain provider, Kaye Nelson CNP and his PCP when it became evident that he was abusing alcohol and had an unintentional opioid overdose.  He was on high dose opioids (210 morphine equivalents/day) for 6 years for chronic back pain - failed back syndrome s/p L5-S1 fusion in 2015. Agreed to detox and eventually was able to discontinue use on his own at home.  Suboxone was initiated on 2/17/2017.      Recommendations/plan at the last visit included:    1. Physical Therapy:  Consider once stable on medication  2. Clinical Health Psychologist to address issues of relaxation, behavioral change, coping style, and other factors important to improvement.  Not at this time  3. Diagnostic Studies:  none  4. Medication Management:  Initiated on 8mg/day - Will INCREASE to 8mg BID today.   5. Further procedures recommended: not at this time      Since his last visit, Torey Coreas reports:  - Doing well, working a ton and enjoying life  - Currently working as much as he wants.  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  [place] acres.    - He states he has PRN valium from his primary for anxiety  - He has been saving some of his suboxone to take as needed.  This is very helpful in managing his pain.     - going to an AA meeting he likes  - wife likes him better now - mood has stabilized    Status since last visit:    Since last visit patient has been: doing well.     Intensity:     There has been: no craving.      Suboxone Dose: adequate   Progression of Symptoms:      Cues to use and relapse triggers: none    Recovery program has been: active.   Accompanying Signs & Symptoms:    Side Effects: none.    Sobriety:     Status: no use since last visit.      Drug Screen: Not obtained at this visit.    Precipitating factors:    Triggers have been: non-existent.   Alleviating factors:    Contact with sponsor has been: no sponsor    Family and support system has been: helpful.   Other Therapies Tried :     Patient has been going to recovery meetings:sporadically.      Patient has been participating in professional counseling/therapy: NO    [place] Board of Pharmacy Data Base Reviewed:    YES; approprate    Pain scores:  Pain intensity on average is 6 on a scale of 0-10.     Side Effects: no side effect    Medications:  Current Outpatient Prescriptions   Medication Sig Dispense Refill     buprenorphine HCl-naloxone HCl (SUBOXONE) 8-2 MG per film Place 1 Film under the tongue 3 times daily 90 Film 0     sertraline (ZOLOFT) 100 MG tablet TAKE 2 TABLETS DAILY 180 tablet 0     levETIRAcetam 1000 MG TABS Take 1,000 mg by mouth 2 times daily 180 tablet 1     diazepam (VALIUM) 5 MG tablet Take 1 tablet (5 mg) by mouth 2 times daily as needed 60 tablet 3     carvedilol (COREG) 25 MG tablet Take 1 tablet (25 mg) by mouth 2 times daily (with meals) 180 tablet 1     folic acid (FOLVITE) 1 MG tablet Take 1 tablet (1 mg) by mouth daily 90 tablet 1     hydrochlorothiazide (HYDRODIURIL) 25 MG tablet Take 1 tablet (25 mg) by mouth daily 90 tablet 1     amitriptyline (ELAVIL) 100 MG tablet Take 1 tablet (100 mg) by mouth nightly as needed for sleep       triamcinolone (KENALOG) 0.1 % cream Apply sparingly to affected area three times daily for 14 days. 30 g 0     betamethasone valerate (VALISONE) 0.1 % cream Apply topically 2 times daily 15 g 1       Medical History: any changes in medical history since they were last seen? No    OBJECTIVE:                                                    /90  (BP Location: Left arm, Patient Position: Chair, Cuff Size: Adult Large)  Pulse 59  Resp 16  SpO2 97%  There is no height or weight on file to calculate BMI.     ROS:  Constitutional, neuro, ENT, endocrine, pulmonary, cardiac, gastrointestinal, genitourinary, musculoskeletal, integument and psychiatric systems are negative, except as otherwise noted.    EXAM:  GENERAL APPEARANCE: healthy, alert and no distress  EYES: Eyes grossly normal to inspection and conjunctivae and sclerae normal  SKIN: no suspicious lesions or rashes  PSYCH: mentation appears normal and affect normal/bright  MENTAL STATUS EXAM:  Appearance/Behavior: No apparent distress and Neatly groomed  Speech: Normal  Mood/Affect: normal affect  Insight: Adequate    Diagnostic Test Results:  No results found for this or any previous visit (from the past 24 hour(s)).         ASSESSMENT:                                                      OPIOID USE DISORDER   ALCOHOL USE DISORDER    ENCOUNTER FOR LONG TERM USE OF HIGH RISK MEDICATION   High Risk Drug Monitoring?  YES   Drug being monitored: Suboxone    Reason for drug: Opioid Use Disorder   What is being monitored?: Dosage, Cravings, Trigger, side effects, and continued abstinence.    CHRONIC LOW BACK PAIN - FAILED BACK SYNDROME  ANXIETY    PLAN:                                                      6. Physical Therapy:  Recommend but he is unwilling at this time   7. Clinical Health Psychologist to address issues of relaxation, behavioral change, coping style, and other factors important to improvement.  Not at this time  8. Diagnostic Studies:  none  9. Medication Management:  Doing well on 8mg TID.   10. Further procedures recommended: not at this time        ICD-10-CM    1. Chronic midline low back pain, with sciatica presence unspecified M54.5     G89.29    2. Opioid use disorder, severe, on maintenance therapy, dependence (H) F11.20 buprenorphine HCl-naloxone HCl (SUBOXONE) 8-2 MG per film   3.  Encounter for long-term (current) use of high-risk medication Z79.899    4. Alcohol abuse F10.10    5. Generalized anxiety disorder F41.1        MEDICATIONS:   Orders Placed This Encounter   Medications     buprenorphine HCl-naloxone HCl (SUBOXONE) 8-2 MG per film     Sig: Place 1 Film under the tongue 3 times daily     Dispense:  90 Film     Refill:  1          - Continue other medications without change  FUTURE APPOINTMENTS:       - Follow-up visit in 8 weeks - second week of August, will plan to do a UDS at that visit    Total time spent was 20 minutes, and more than 50% of face to face time was spent in counseling and/or coordination of care.      Mikayla NievesMD

## 2017-06-16 NOTE — NURSING NOTE
Chief Complaint   Patient presents with     Pain       Initial /90 (BP Location: Left arm, Patient Position: Chair, Cuff Size: Adult Large)  Pulse 59  Resp 16  SpO2 97% Estimated body mass index is 29.43 kg/(m^2) as calculated from the following:    Height as of 3/27/17: 1.829 m (6').    Weight as of 5/19/17: 98.4 kg (217 lb).  Medication Reconciliation: complete       Eloy Hanna MA  [facility] Management Center

## 2017-06-19 NOTE — TELEPHONE ENCOUNTER
Called Darrell but he said no longer needed, he ended up seeing Dr. Nieves.    Romy Pereira, MSN, RN-BC  Care Coordinator  [facility]

## 2017-07-02 DIAGNOSIS — F33.1 MAJOR DEPRESSIVE DISORDER, RECURRENT EPISODE, MODERATE (H): ICD-10-CM

## 2017-07-03 ENCOUNTER — MYC MEDICAL ADVICE (OUTPATIENT)
Dept: FAMILY MEDICINE | Facility: CLINIC | Age: 47
End: 2017-07-03

## 2017-07-03 RX ORDER — SERTRALINE HYDROCHLORIDE 100 MG/1
TABLET, FILM COATED ORAL
Qty: 180 TABLET | Refills: 0 | Status: SHIPPED | OUTPATIENT
Start: 2017-07-03 | End: 2017-10-01

## 2017-07-03 NOTE — TELEPHONE ENCOUNTER
PHQ-9 SCORE 12/18/2015 1/29/2016 10/7/2016   Total Score - - -   Total Score 4 9 4     SHAD-7 SCORE 12/10/2015 1/29/2016 10/7/2016   Total Score - - -   Total Score 2 0 2     PHQ9/ GAD7 sent via [facility] for completion.  Refill given.  SHARLA Riddle RN

## 2017-07-03 NOTE — TELEPHONE ENCOUNTER
sertraline (ZOLOFT) 100 MG tablet     Last Written Prescription Date: 04/04/2017  Last Fill Quantity: 180 tablet, # refills: 0  Last Office Visit with [facility] primary care provider:  01/12/2017        Last PHQ-9 score on record=   PHQ-9 SCORE 10/7/2016   Total Score 4

## 2017-07-12 DIAGNOSIS — M54.50 CHRONIC BILATERAL LOW BACK PAIN WITHOUT SCIATICA: ICD-10-CM

## 2017-07-12 DIAGNOSIS — G89.29 CHRONIC BILATERAL LOW BACK PAIN WITHOUT SCIATICA: ICD-10-CM

## 2017-07-12 NOTE — TELEPHONE ENCOUNTER
Diazepam 5      Last Written Prescription Date:  1/12/17  Last Fill Quantity: 60,   # refills: 3  Last Office Visit with [facility] or [facility] prescribing provider: 1/12/17  Future Office visit:       Routing refill request to provider for review/approval because:  Drug not on the [facility], [facility] or [facility] refill protocol or controlled substance      Thank You!  Maricarmen Wallace  [facility]  P: [phone] F:[phone]

## 2017-07-14 DIAGNOSIS — M54.50 CHRONIC BILATERAL LOW BACK PAIN WITHOUT SCIATICA: ICD-10-CM

## 2017-07-14 DIAGNOSIS — G89.29 CHRONIC BILATERAL LOW BACK PAIN WITHOUT SCIATICA: ICD-10-CM

## 2017-07-14 RX ORDER — DIAZEPAM 5 MG
5 TABLET ORAL 2 TIMES DAILY PRN
Qty: 60 TABLET | Refills: 0 | Status: SHIPPED | OUTPATIENT
Start: 2017-07-14 | End: 2017-11-28

## 2017-07-14 RX ORDER — DIAZEPAM 5 MG
5 TABLET ORAL 2 TIMES DAILY PRN
Qty: 60 TABLET | Refills: 3 | Status: SHIPPED | OUTPATIENT
Start: 2017-07-14 | End: 2017-07-14

## 2017-07-16 ASSESSMENT — ANXIETY QUESTIONNAIRES
6. BECOMING EASILY ANNOYED OR IRRITABLE: NOT AT ALL
GAD7 TOTAL SCORE: 0
4. TROUBLE RELAXING: NOT AT ALL
3. WORRYING TOO MUCH ABOUT DIFFERENT THINGS: NOT AT ALL
5. BEING SO RESTLESS THAT IT IS HARD TO SIT STILL: NOT AT ALL
7. FEELING AFRAID AS IF SOMETHING AWFUL MIGHT HAPPEN: NOT AT ALL
1. FEELING NERVOUS, ANXIOUS, OR ON EDGE: NOT AT ALL
7. FEELING AFRAID AS IF SOMETHING AWFUL MIGHT HAPPEN: NOT AT ALL
2. NOT BEING ABLE TO STOP OR CONTROL WORRYING: NOT AT ALL
GAD7 TOTAL SCORE: 0
GAD7 TOTAL SCORE: 0

## 2017-07-16 ASSESSMENT — PATIENT HEALTH QUESTIONNAIRE - PHQ9
10. IF YOU CHECKED OFF ANY PROBLEMS, HOW DIFFICULT HAVE THESE PROBLEMS MADE IT FOR YOU TO DO YOUR WORK, TAKE CARE OF THINGS AT HOME, OR GET ALONG WITH OTHER PEOPLE: NOT DIFFICULT AT ALL
SUM OF ALL RESPONSES TO PHQ QUESTIONS 1-9: 0
SUM OF ALL RESPONSES TO PHQ QUESTIONS 1-9: 0

## 2017-07-17 ASSESSMENT — PATIENT HEALTH QUESTIONNAIRE - PHQ9: SUM OF ALL RESPONSES TO PHQ QUESTIONS 1-9: 0

## 2017-07-17 ASSESSMENT — ANXIETY QUESTIONNAIRES: GAD7 TOTAL SCORE: 0

## 2017-07-20 NOTE — TELEPHONE ENCOUNTER
PHQ-9 SCORE 1/29/2016 10/7/2016 7/16/2017   Total Score - - -   Total Score MyChart - - 0   Total Score 9 4 0     SHAD-7 SCORE 1/29/2016 10/7/2016 7/16/2017   Total Score - - -   Total Score - - 0 (minimal anxiety)   Total Score 0 2 0     SHARLA Riddle, RN

## 2017-08-08 ENCOUNTER — TELEPHONE (OUTPATIENT)
Dept: PALLIATIVE MEDICINE | Facility: CLINIC | Age: 47
End: 2017-08-08

## 2017-08-08 DIAGNOSIS — F11.20 OPIOID USE DISORDER, SEVERE, ON MAINTENANCE THERAPY, DEPENDENCE (H): ICD-10-CM

## 2017-08-08 NOTE — TELEPHONE ENCOUNTER
Patient is wanting to know if he can skip this visit coming up and get refill and come in next month. Please call patient.     Leeann Coe    [facility]

## 2017-08-09 RX ORDER — BUPRENORPHINE AND NALOXONE 8; 2 MG/1; MG/1
1 FILM, SOLUBLE BUCCAL; SUBLINGUAL 3 TIMES DAILY
Qty: 42 FILM | Refills: 0 | Status: SHIPPED | OUTPATIENT
Start: 2017-08-11 | End: 2017-08-25

## 2017-08-09 NOTE — TELEPHONE ENCOUNTER
Discussed with provider - Torey needs to come to appointment.    Romy Pereira, MSN, RN-BC  Care Coordinator  [facility] [place]

## 2017-08-09 NOTE — TELEPHONE ENCOUNTER
"Called pt. Let him know that Dr. Nieves wants him to come to the appointment on Friday. Pt states that he is not able to come now. States that his wife was just called away on a business trip so he has to  his son from camp now and that he also has to help his mom set up for a family reunion that day. Pt states that he is willing to come make an appt in a couple of weeks if needed or in month but he just can't make it on Friday.  Appointment cancelled per pt's request.     Pt state that he hopes Dr. Nieves will still provide a refill for him of suboxone. States that he will be out around 8/11.  Reviewed prescription on file with patient. The original one was written on 6/19 for 90 films with directions to take TID; there was one refill as well. Discussed that the first fill should have lasted until 7/19 and then 2nd one until 8/18.  Pt states that Dr. Nieves gave him some flexibility in how much he can take.  Let him know that I would have Dr. Nieves review this call and that we would get back to him about the refill and the recommendation for the timing of his next appointment.     Pt states that he and Dr. Nieves have known each other for a long time and that she can call him to talk with him directly if needed. States that the last time he was cut off of his meds, he spent 3 days in the bathroom and that would be tough to take during the family reunion.  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 Nieves will understand when she knows the circumstances.    Received call from patient requesting refill(s) of suboxone 8-2 films     Last picked up from pharmacy on: 7/12/17; previous Rx was picked up on 6/16 (not 6/19 as entered on medication list). Pharmacy staff said that the prescription was authorized by Dr. Nieves.   Due date: 8/15/17 if prescribed supply is supposed to last 30 " days.     Pt last seen on 6/16/17  Next appt scheduled for TBD    Last urine drug screen 4/21/17  Current opioid agreement on file No; last one on file is with PCP in March 2016    Processing (pick one): fax to [facility] pharmacy     Will route to Dr. Nieves for review.  Prescription NOT prepped.     TO ShettyN, RN-BC  Patient Care Supervisor/Care Coordinator  [facility] [place]

## 2017-08-09 NOTE — TELEPHONE ENCOUNTER
I will refill his medication for 2 weeks. This will be faxed to his pharmacy.  I expect to see him in clinic for a follow up appointment and UDS before he runs out.  I have multiple openings on the 18th at [facility].  Alternatively, he can follow up with me in [place] if there are openings there.  He has a prescription for 8mg three times daily and should not take more than 24mg a day.  There is no variability on this and I will not refill early again if he runs out early again.  It is likely that insurance will not cover this prescription because it is an early fill and he may need to pay cash for it.  Please relay this information as I am in procedures all day and will not have time to call him myself.  Thank you!  Mikayla Nieves MD

## 2017-08-09 NOTE — TELEPHONE ENCOUNTER
Rx faxed to:      [id number] [place], [place] - [address]  [address] [address]  Phone: [phone] Fax: [phone]    Fax confirmation received. Hardcopy destroyed.     Romy Stewart CMA   [facility]

## 2017-08-11 DIAGNOSIS — G40.909 SEIZURE DISORDER (H): ICD-10-CM

## 2017-08-11 DIAGNOSIS — F10.10 ALCOHOL ABUSE: ICD-10-CM

## 2017-08-11 DIAGNOSIS — I10 HTN, GOAL BELOW 140/90: ICD-10-CM

## 2017-08-11 NOTE — TELEPHONE ENCOUNTER
hydrochlorothiazide (HYDRODIURIL) 25 MG tablet      Last Written Prescription Date: 6/27/2016  Last Fill Quantity: 90, # refills: 1  Last Office Visit with [facility] or [facility] prescribing provider: 1/12/2017       Potassium   Date Value Ref Range Status   05/24/2016 4.6 3.4 - 5.3 mmol/L Final     Creatinine   Date Value Ref Range Status   05/24/2016 0.80 0.66 - 1.25 mg/dL Final     BP Readings from Last 3 Encounters:   06/16/17 130/90   05/24/17 129/88   05/19/17 129/88     levetiracetam      Last Written Prescription Date: 1/30/2017  Last Fill Quantity: 180,  # refills: 1   Last Office Visit with [facility], [facility] or [facility] prescribing provider: 1/12/2017                                             Folic acid      Last Written Prescription Date: 8/24/2016  Last Fill Quantity: 90,  # refills: 1   Last Office Visit with [facility], [facility] or [facility] prescribing provider: 1/12/2017                                             carvedilol      Last Written Prescription Date: 10/25/2016  Last Fill Quantity: 180, # refills: 1    Last Office Visit with [facility], [facility] or [facility] prescribing provider:  1/12/2017   Future Office Visit:        BP Readings from Last 3 Encounters:   06/16/17 130/90   05/24/17 129/88   05/19/17 129/88

## 2017-08-11 NOTE — TELEPHONE ENCOUNTER
Chart review shows no appointment scheduled as yet.    Romy Pereira, MSN, RN-BC  Care Coordinator  [facility] [place]

## 2017-08-14 NOTE — TELEPHONE ENCOUNTER
Read by Torey Coreas at 12/1/2016  1:04 AM   Keppra level is above therapeutic range. As long as you are not experiencing any side effects, I would like you to keep your dose the same.     Please let us know if you have any questions or concerns.     Princess Werner      Component      Latest Ref Rng & Units 11/8/2016   Levetiracetam Level       44.7 (H)     Discussed refill requests with Liana who advises pt need to be seen.   LM to call clinic nurse.  SHARLA Riddle RN

## 2017-08-15 ENCOUNTER — MYC MEDICAL ADVICE (OUTPATIENT)
Dept: ADDICTION MEDICINE | Facility: CLINIC | Age: 47
End: 2017-08-15

## 2017-08-16 RX ORDER — CARVEDILOL 25 MG/1
TABLET ORAL
Qty: 180 TABLET | Refills: 0 | Status: SHIPPED
Start: 2017-08-16 | End: 2017-11-28

## 2017-08-16 RX ORDER — FOLIC ACID 1 MG/1
TABLET ORAL
Qty: 90 TABLET | Refills: 0 | Status: SHIPPED
Start: 2017-08-16 | End: 2017-11-28

## 2017-08-16 RX ORDER — HYDROCHLOROTHIAZIDE 25 MG/1
TABLET ORAL
Qty: 90 TABLET | Refills: 0 | Status: SHIPPED
Start: 2017-08-16 | End: 2017-11-28

## 2017-08-16 RX ORDER — LEVETIRACETAM 1000 MG/1
TABLET ORAL
Qty: 180 TABLET | Refills: 0 | Status: SHIPPED
Start: 2017-08-16 | End: 2017-11-28

## 2017-08-16 NOTE — TELEPHONE ENCOUNTER
Dr. Nieves, see note below - patient needing note to get meds released to him from detox.     Romy Pereira, MSN, RN-BC  Care Coordinator  [facility] [place]

## 2017-08-25 ENCOUNTER — OFFICE VISIT (OUTPATIENT)
Dept: PALLIATIVE MEDICINE | Facility: CLINIC | Age: 47
End: 2017-08-25
Payer: COMMERCIAL

## 2017-08-25 VITALS — HEART RATE: 80 BPM | RESPIRATION RATE: 16 BRPM | DIASTOLIC BLOOD PRESSURE: 82 MMHG | SYSTOLIC BLOOD PRESSURE: 130 MMHG

## 2017-08-25 DIAGNOSIS — G89.4 CHRONIC PAIN SYNDROME: ICD-10-CM

## 2017-08-25 DIAGNOSIS — F11.20 OPIOID USE DISORDER, SEVERE, ON MAINTENANCE THERAPY, DEPENDENCE (H): ICD-10-CM

## 2017-08-25 DIAGNOSIS — M79.18 MYOFASCIAL PAIN: ICD-10-CM

## 2017-08-25 DIAGNOSIS — Z79.899 ENCOUNTER FOR LONG-TERM (CURRENT) USE OF HIGH-RISK MEDICATION: ICD-10-CM

## 2017-08-25 DIAGNOSIS — F11.20 OPIOID USE DISORDER, SEVERE, ON MAINTENANCE THERAPY, DEPENDENCE (H): Primary | ICD-10-CM

## 2017-08-25 LAB
AMPHETAMINES UR QL: NOT DETECTED NG/ML
BARBITURATES UR QL SCN: NOT DETECTED NG/ML
BENZODIAZ UR QL SCN: ABNORMAL NG/ML
BUPRENORPHINE UR QL: ABNORMAL NG/ML
CANNABINOIDS UR QL: NOT DETECTED NG/ML
COCAINE UR QL SCN: NOT DETECTED NG/ML
D-METHAMPHET UR QL: NOT DETECTED NG/ML
METHADONE UR QL SCN: NOT DETECTED NG/ML
OPIATES UR QL SCN: NOT DETECTED NG/ML
OXYCODONE UR QL SCN: NOT DETECTED NG/ML
PCP UR QL SCN: NOT DETECTED NG/ML
PROPOXYPH UR QL: NOT DETECTED NG/ML
TRICYCLICS UR QL SCN: ABNORMAL NG/ML

## 2017-08-25 PROCEDURE — 80306 DRUG TEST PRSMV INSTRMNT: CPT | Performed by: ANESTHESIOLOGY

## 2017-08-25 PROCEDURE — 99214 OFFICE O/P EST MOD 30 MIN: CPT | Performed by: ANESTHESIOLOGY

## 2017-08-25 RX ORDER — CYCLOBENZAPRINE HCL 10 MG
10 TABLET ORAL 3 TIMES DAILY PRN
Qty: 90 TABLET | Refills: 1 | Status: SHIPPED | OUTPATIENT
Start: 2017-08-25 | End: 2017-10-02

## 2017-08-25 RX ORDER — BUPRENORPHINE AND NALOXONE 8; 2 MG/1; MG/1
1 FILM, SOLUBLE BUCCAL; SUBLINGUAL 3 TIMES DAILY
Qty: 90 FILM | Refills: 0 | Status: SHIPPED | OUTPATIENT
Start: 2017-08-25 | End: 2017-08-29

## 2017-08-25 ASSESSMENT — PAIN SCALES - GENERAL: PAINLEVEL: EXTREME PAIN (8)

## 2017-08-25 NOTE — MR AVS SNAPSHOT
After Visit Summary   8/25/2017    Torey Coreas    MRN: [id number]           Patient Information     Date Of Birth          1970        Visit Information        Provider Department      8/25/2017 11:00 AM Mikayla Nieves MD [facility]        Today's Diagnoses     Opioid use disorder, severe, on maintenance therapy, dependence (H)    -  1    Myofascial pain        Chronic pain syndrome        Encounter for long-term (current) use of high-risk medication          Care Instructions    1. September 18th or 19th follow up   2. Flexeril prescription     Mikayla Nieves MD       ----------------------------------------------------------------  Nurse Triage line:  [phone]   Call this number with any questions or concerns. You may leave a detailed message anytime. Calls are typically returned Monday through Friday between 8 AM and 4:30 PM. We usually get back to you within 2 business days depending on the issue/request.       Medication refills:    For non-narcotic medications, call your pharmacy directly to request a refill. The pharmacy will contact the [facility] for authorization. Please allow 3-4 days for these refills to be processed.     For narcotic refills, call the nurse triage line or send a W[employer]can message. Please contact us 7-10 days before your refill is due. The message MUST include the name of the specific medication(s) requested and how you would like to receive the prescription(s). The options are as follows:    Pain Clinic staff can mail the prescription to your pharmacy. Please tell us the name of the pharmacy.    You may pick the prescription up at the Pain Clinic (tell us the location) or during a clinic visit with your pain provider    Pain Clinic staff can deliver the prescription to the [facility] pharmacy in the clinic building. Please tell us the location.      Scheduling number: [phone].  Call this number to schedule or change  appointments.    We believe regular attendance is key to your success in our program.    Any time you are unable to keep your appointment we ask that you call us at least 24 hours in advance to let us know. This will allow us to offer the appointment time to another patient.               Follow-ups after your visit        Who to contact     If you have questions or need follow up information about today's clinic visit or your schedule please contact [facility] directly at [phone].  Normal or non-critical lab and imaging results will be communicated to you by [employer] Information System[employer]art, letter or phone within 4 business days after the clinic has received the results. If you do not hear from us within 7 days, please contact the clinic through Bee[employer] or phone. If you have a critical or abnormal lab result, we will notify you by phone as soon as possible.  Submit refill requests through Bee[employer] or call your pharmacy and they will forward the refill request to us. Please allow 3 business days for your refill to be completed.          Additional Information About Your Visit        Beezag Information     Beezag gives you secure access to your electronic health record. If you see a primary care provider, you can also send messages to your care team and make appointments. If you have questions, please call your primary care clinic.  If you do not have a primary care provider, please call [phone] and they will assist you.        Care EveryWhere ID     This is your Care EveryWhere ID. This could be used by other organizations to access your [facility] medical records  [id number]        Your Vitals Were     Pulse Respirations                80 16           Blood Pressure from Last 3 Encounters:   08/25/17 130/82   06/16/17 130/90   05/24/17 129/88    Weight from Last 3 Encounters:   05/19/17 98.4 kg (217 lb)   03/27/17 98.4 kg (217 lb)   03/24/17 98.4 kg (217 lb)              Today, you had the following     No  orders found for display         Today's Medication Changes          These changes are accurate as of: 8/25/17 11:30 AM.  If you have any questions, ask your nurse or doctor.               Start taking these medicines.        Dose/Directions    cyclobenzaprine 10 MG tablet   Commonly known as:  FLEXERIL   Used for:  Myofascial pain   Started by:  Mikayla Nieves MD        Dose:  10 mg   Take 1 tablet (10 mg) by mouth 3 times daily as needed for muscle spasms   Quantity:  90 tablet   Refills:  1         These medicines have changed or have updated prescriptions.        Dose/Directions    * buprenorphine HCl-naloxone HCl 8-2 MG per film   Commonly known as:  SUBOXONE   This may have changed:  Another medication with the same name was added. Make sure you understand how and when to take each.   Used for:  Opioid use disorder, severe, on maintenance therapy, dependence (H)   Changed by:  Mikayla Nieves MD        Dose:  1 Film   Place 1 Film under the tongue 3 times daily for 14 days   Quantity:  42 Film   Refills:  0       * buprenorphine HCl-naloxone HCl 8-2 MG per film   Commonly known as:  SUBOXONE   This may have changed:  You were already taking a medication with the same name, and this prescription was added. Make sure you understand how and when to take each.   Used for:  Opioid use disorder, severe, on maintenance therapy, dependence (H), Encounter for long-term (current) use of high-risk medication   Changed by:  Mikayla Nieves MD        Dose:  1 Film   Place 1 Film under the tongue 3 times daily   Quantity:  90 Film   Refills:  0       * Notice:  This list has 2 medication(s) that are the same as other medications prescribed for you. Read the directions carefully, and ask your doctor or other care provider to review them with you.         Where to get your medicines      These medications were sent to [id number] - [place], [place] - [address]  [address] [address]     Hours:  Closed 10-16-08 business to [facility] Phone:  [phone]     cyclobenzaprine 10 MG tablet         Some of these will need a paper prescription and others can be bought over the counter.  Ask your nurse if you have questions.     Bring a paper prescription for each of these medications     buprenorphine HCl-naloxone HCl 8-2 MG per film                Primary Care Provider Office Phone # Fax #    Irene Shultz [phone]       [address] [place] [id number]        Goals        General    I want to start CD treatment program this year.  started 11-19-15 (pt-stated)     Notes - Note created  11/19/2015 12:55 PM by Michelle Blanca LSW    As of today's date 11/19/2015 goal is met at 26 - 50%.   Goal Status:  Active   [facility] : Hadii aad ku hadasho Somoise, waaxda luqadaha, qaybta kaalmada steve, rosario wilcox . So [facility] [phone].    ATENCIÓN: Si habla español, tiene a rogel disposición servicios gratuitos de asistencia lingüística. Manjit al [phone].    We comply with applicable federal civil rights laws and [place] laws. We do not discriminate on the basis of race, color, national origin, age, disability sex, sexual orientation or gender identity.            Thank you!     Thank you for choosing [facility]  for your care. Our goal is always to provide you with excellent care. Hearing back from our patients is one way we can continue to improve our services. Please take a few minutes to complete the written survey that you may receive in the mail after your visit with us. Thank you!             Your Updated Medication List - Protect others around you: Learn how to safely use, store and throw away your medicines at www.disposemymeds.org.          This list is accurate as of: 8/25/17 11:30 AM.  Always use your most recent med list.                    Brand Name Dispense Instructions for use Diagnosis    amitriptyline 100 MG tablet    ELAVIL     Take 1 tablet (100 mg) by mouth nightly as needed for sleep    Other chronic pain       betamethasone valerate 0.1 % cream    VALISONE    15 g    Apply topically 2 times daily    Dermatitis       * buprenorphine HCl-naloxone HCl 8-2 MG per film    SUBOXONE    42 Film    Place 1 Film under the tongue 3 times daily for 14 days    Opioid use disorder, severe, on maintenance therapy, dependence (H)       * buprenorphine HCl-naloxone HCl 8-2 MG per film    SUBOXONE    90 Film    Place 1 Film under the tongue 3 times daily    Opioid use disorder, severe, on maintenance therapy, dependence (H), Encounter for long-term (current) use of high-risk medication       carvedilol 25 MG tablet    COREG    180 tablet    TAKE 1 TABLET TWICE A DAY WITH MEALS    HTN, goal below 140/90       cyclobenzaprine 10 MG tablet    FLEXERIL    90 tablet    Take 1 tablet (10 mg) by mouth 3 times daily as needed for muscle spasms    Myofascial pain       diazepam 5 MG tablet    VALIUM    60 tablet    Take 1 tablet (5 mg) by mouth 2 times daily as needed Needs an office visit for further refills    Chronic bilateral low back pain without sciatica       folic acid 1 MG tablet    FOLVITE    90 tablet    TAKE 1 TABLET DAILY    Alcohol abuse       hydrochlorothiazide 25 MG tablet    HYDRODIURIL    90 tablet    TAKE 1 TABLET DAILY    HTN, goal below 140/90       levETIRAcetam 1000 MG Tabs     180 tablet    TAKE 1 TABLET TWICE A DAY    Seizure disorder (H)       sertraline 100 MG tablet    ZOLOFT    180 tablet    TAKE 2 TABLETS DAILY    Major depressive disorder, recurrent episode, moderate (H)       triamcinolone 0.1 % cream    KENALOG    30 g    Apply sparingly to affected area three times daily for 14 days.    Contact dermatitis due to poison ivy       * Notice:  This list has 2 medication(s) that are the same as other medications prescribed  for you. Read the directions carefully, and ask your doctor or other care provider to review them with you.

## 2017-08-25 NOTE — NURSING NOTE
Chief Complaint   Patient presents with     Pain       Initial /82 (BP Location: Right arm, Patient Position: Chair, Cuff Size: Adult Regular)  Pulse 80  Resp 16 Estimated body mass index is 29.43 kg/(m^2) as calculated from the following:    Height as of 3/27/17: 1.829 m (6').    Weight as of 5/19/17: 98.4 kg (217 lb).  Medication Reconciliation: complete       Eloy Hanna MA  [facility]

## 2017-08-25 NOTE — PROGRESS NOTES
"                          [facility]    Date of visit: 8/25/2017    Chief complaint:   Chief Complaint   Patient presents with     Pain     Interval history:  Torey Coreas is a 47 year old male here for Chronic Pain and Addiction/Suboxone follow up.   Last visit: 6/16/2017    CURRENT DOSE: 8mg TID  BRIEF HPI: The patient is a 47 year old male who was referred to me by his chronic pain provider, Kaye Nelson CNP and his PCP when it became evident that he was abusing alcohol and had an unintentional opioid overdose.  He was on high dose opioids (210 morphine equivalents/day) for 6 years for chronic back pain - failed back syndrome s/p L5-S1 fusion in 2015. Agreed to detox and eventually was able to discontinue use on his own at home.  Suboxone was initiated on 2/17/2017.  He recently (8/15/2017) had a relapse with [employer].     Since his last visit, Torey Coreas reports:  - Went on a \"alcohol chaudhari\" the weekend of the 15th this month and on Monday checked himself into detox.  Called to notify me of this.  He was arrested and is dealing with the repercussions of this.  He was not able to have his suboxone at the detox in [place] and went a couple days without it.  He has been back on it since being discharged.   - He plans to do outpatient treatment.   - requesting SOMA and valium for a muscle relaxant - discussed that I do not recommend these with suboxone.  I am okay with him taking flexeril  - Currently working as much as he wants.  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  [place].    - He has been saving some of his suboxone to take as needed.  This is very helpful in managing his pain.     - going to an AA meeting he likes  - Family is supportive.  His wife was angry but continues to support and love him.     Status since last visit:    Since last visit patient has been: doing well.     Intensity:     There " has been: no craving.      Suboxone Dose: adequate   Progression of Symptoms:     Cues to use and relapse triggers: none    Recovery program has been: active.   Accompanying Signs & Symptoms:    Side Effects: none.    Sobriety:     Status: no use since last visit.      Drug Screen: Not obtained at this visit.    Precipitating factors:    Triggers have been: non-existent.   Alleviating factors:    Contact with sponsor has been: no sponsor    Family and support system has been: helpful.   Other Therapies Tried :     Patient has been going to recovery meetings:sporadically.      Patient has been participating in professional counseling/therapy: NO    [place] Board of Pharmacy Data Base Reviewed:    YES; approprate    Pain scores:  Pain intensity on average is 6 on a scale of 0-10.     Side Effects: no side effect    Medications:  Current Outpatient Prescriptions   Medication Sig Dispense Refill     carvedilol (COREG) 25 MG tablet TAKE 1 TABLET TWICE A DAY WITH MEALS 180 tablet 0     folic acid (FOLVITE) 1 MG tablet TAKE 1 TABLET DAILY 90 tablet 0     levETIRAcetam 1000 MG TABS TAKE 1 TABLET TWICE A  tablet 0     hydrochlorothiazide (HYDRODIURIL) 25 MG tablet TAKE 1 TABLET DAILY 90 tablet 0     buprenorphine HCl-naloxone HCl (SUBOXONE) 8-2 MG per film Place 1 Film under the tongue 3 times daily for 14 days 42 Film 0     diazepam (VALIUM) 5 MG tablet Take 1 tablet (5 mg) by mouth 2 times daily as needed Needs an office visit for further refills 60 tablet 0     sertraline (ZOLOFT) 100 MG tablet TAKE 2 TABLETS DAILY 180 tablet 0     amitriptyline (ELAVIL) 100 MG tablet Take 1 tablet (100 mg) by mouth nightly as needed for sleep       triamcinolone (KENALOG) 0.1 % cream Apply sparingly to affected area three times daily for 14 days. 30 g 0     betamethasone valerate (VALISONE) 0.1 % cream Apply topically 2 times daily 15 g 1       Medical History: any changes in medical history since they were last seen?  No    OBJECTIVE:                                                    /82 (BP Location: Right arm, Patient Position: Chair, Cuff Size: Adult Regular)  Pulse 80  Resp 16  There is no height or weight on file to calculate BMI.     ROS:  Constitutional, neuro, ENT, endocrine, pulmonary, cardiac, gastrointestinal, genitourinary, musculoskeletal, integument and psychiatric systems are negative, except as otherwise noted.    EXAM:  GENERAL APPEARANCE: healthy, alert and no distress  EYES: Eyes grossly normal to inspection and conjunctivae and sclerae normal  SKIN: no suspicious lesions or rashes  PSYCH: mentation appears normal and affect normal/bright  MENTAL STATUS EXAM:  Appearance/Behavior: No apparent distress and Neatly groomed  Speech: Normal  Mood/Affect: normal affect  Insight: Adequate    Diagnostic Test Results:  No results found for this or any previous visit (from the past 24 hour(s)).       ASSESSMENT:                                                      OPIOID USE DISORDER   ALCOHOL USE DISORDER    ENCOUNTER FOR LONG TERM USE OF HIGH RISK MEDICATION   High Risk Drug Monitoring?  YES   Drug being monitored: Suboxone    Reason for drug: Opioid Use Disorder   What is being monitored?: Dosage, Cravings, Trigger, side effects, and continued abstinence.    CHRONIC LOW BACK PAIN - FAILED BACK SYNDROME  ANXIETY    PLAN:                                                      COUNSELING: We discussed addiction and relapse prevention.  He plans to do outpatient treatment and is dealing with the legal aftereffects of his behavior.   His family remains supportive and he seems to be ready to get back on track with his recovery after this relapse with alcohol.     OTHER: Do not recommend valium or soma as a muscle relaxant.  I have given him a prescription for flexeril today.       ICD-10-CM    1. Opioid use disorder, severe, on maintenance therapy, dependence (H) F11.20 buprenorphine HCl-naloxone HCl (SUBOXONE) 8-2  MG per film   2. Myofascial pain M79.1 cyclobenzaprine (FLEXERIL) 10 MG tablet   3. Chronic pain syndrome G89.4    4. Encounter for long-term (current) use of high-risk medication Z79.899 buprenorphine HCl-naloxone HCl (SUBOXONE) 8-2 MG per film       MEDICATIONS:   Orders Placed This Encounter   Medications     cyclobenzaprine (FLEXERIL) 10 MG tablet     Sig: Take 1 tablet (10 mg) by mouth 3 times daily as needed for muscle spasms     Dispense:  90 tablet     Refill:  1     buprenorphine HCl-naloxone HCl (SUBOXONE) 8-2 MG per film     Sig: Place 1 Film under the tongue 3 times daily     Dispense:  90 Film     Refill:  0          - Continue other medications without change    FUTURE APPOINTMENTS:       - Follow-up visit in 4 weeks - at [facility] clinic September 18th or 19th, 2017.  We can spread visits back out to every 2 months at that time if he continues to stay sober and remains active in recovery.     Total time spent was 30 minutes, and more than 50% of face to face time was spent in counseling and/or coordination of care.      Mikayla NievesMD

## 2017-08-25 NOTE — PATIENT INSTRUCTIONS
1. September 18th or 19th follow up   2. Flexeril prescription     Mikayla Nieves MD       ----------------------------------------------------------------  Nurse Triage line:  [phone]   Call this number with any questions or concerns. You may leave a detailed message anytime. Calls are typically returned Monday through Friday between 8 AM and 4:30 PM. We usually get back to you within 2 business days depending on the issue/request.       Medication refills:    For non-narcotic medications, call your pharmacy directly to request a refill. The pharmacy will contact the [facility] for authorization. Please allow 3-4 days for these refills to be processed.     For narcotic refills, call the nurse triage line or send a Imgur message. Please contact us 7-10 days before your refill is due. The message MUST include the name of the specific medication(s) requested and how you would like to receive the prescription(s). The options are as follows:    Pain Clinic staff can mail the prescription to your pharmacy. Please tell us the name of the pharmacy.    You may pick the prescription up at the Pain Clinic (tell us the location) or during a clinic visit with your pain provider    Pain Clinic staff can deliver the prescription to the [place] pharmacy in the clinic building. Please tell us the location.      Scheduling number: [phone].  Call this number to schedule or change appointments.    We believe regular attendance is key to your success in our program.    Any time you are unable to keep your appointment we ask that you call us at least 24 hours in advance to let us know. This will allow us to offer the appointment time to another patient.

## 2017-08-29 ENCOUNTER — MYC MEDICAL ADVICE (OUTPATIENT)
Dept: ADDICTION MEDICINE | Facility: CLINIC | Age: 47
End: 2017-08-29

## 2017-08-29 NOTE — TELEPHONE ENCOUNTER
Message from patient copied into encounter already opened for this of 8/27/17. Closing this one to avoid duplicative documentation.

## 2017-10-01 DIAGNOSIS — F33.1 MAJOR DEPRESSIVE DISORDER, RECURRENT EPISODE, MODERATE (H): ICD-10-CM

## 2017-10-01 NOTE — TELEPHONE ENCOUNTER
sertraline (ZOLOFT) 100 MG tablet     Last Written Prescription Date: 7/3/17  Last Fill Quantity: 180, # refills: 0  Last Office Visit with [facility] primary care provider:  1/12/17   Next 5 appointments (look out 90 days)     Oct 02, 2017 11:40 AM CDT   Return Visit with Mikayla Nieves MD   [facility] ([facility])    [address]   [address]  [facility] [address] [phone]                   Last PHQ-9 score on record=   PHQ-9 SCORE 7/16/2017   Total Score -   Total Score MyChart 0   Total Score 0       Trupti HE(R)

## 2017-10-02 ENCOUNTER — OFFICE VISIT (OUTPATIENT)
Dept: ADDICTION MEDICINE | Facility: CLINIC | Age: 47
End: 2017-10-02
Payer: COMMERCIAL

## 2017-10-02 VITALS
SYSTOLIC BLOOD PRESSURE: 120 MMHG | BODY MASS INDEX: 29.8 KG/M2 | OXYGEN SATURATION: 100 % | RESPIRATION RATE: 12 BRPM | HEIGHT: 72 IN | HEART RATE: 77 BPM | TEMPERATURE: 97.5 F | DIASTOLIC BLOOD PRESSURE: 80 MMHG | WEIGHT: 220 LBS

## 2017-10-02 DIAGNOSIS — Z79.899 ENCOUNTER FOR LONG-TERM (CURRENT) USE OF HIGH-RISK MEDICATION: ICD-10-CM

## 2017-10-02 DIAGNOSIS — M79.18 MYOFASCIAL PAIN: ICD-10-CM

## 2017-10-02 DIAGNOSIS — G89.4 CHRONIC PAIN SYNDROME: ICD-10-CM

## 2017-10-02 DIAGNOSIS — F11.20 OPIOID USE DISORDER, SEVERE, ON MAINTENANCE THERAPY, DEPENDENCE (H): Primary | ICD-10-CM

## 2017-10-02 PROCEDURE — 99215 OFFICE O/P EST HI 40 MIN: CPT | Performed by: ANESTHESIOLOGY

## 2017-10-02 RX ORDER — SERTRALINE HYDROCHLORIDE 100 MG/1
TABLET, FILM COATED ORAL
Qty: 180 TABLET | Refills: 1 | Status: SHIPPED | OUTPATIENT
Start: 2017-10-02 | End: 2017-11-28

## 2017-10-02 RX ORDER — BUPRENORPHINE AND NALOXONE 8; 2 MG/1; MG/1
1 FILM, SOLUBLE BUCCAL; SUBLINGUAL 3 TIMES DAILY
Qty: 90 FILM | Refills: 1 | Status: SHIPPED | OUTPATIENT
Start: 2017-10-02 | End: 2017-11-27

## 2017-10-02 RX ORDER — CYCLOBENZAPRINE HCL 10 MG
10 TABLET ORAL 3 TIMES DAILY PRN
Qty: 90 TABLET | Refills: 1 | Status: SHIPPED | OUTPATIENT
Start: 2017-10-02 | End: 2017-11-27

## 2017-10-02 RX ORDER — AMITRIPTYLINE HYDROCHLORIDE 100 MG/1
100 TABLET ORAL AT BEDTIME
Qty: 30 TABLET | Refills: 1 | Status: SHIPPED | OUTPATIENT
Start: 2017-10-02 | End: 2017-11-27

## 2017-10-02 NOTE — MR AVS SNAPSHOT
After Visit Summary   10/2/2017    Torey Coreas    MRN: [id number]           Patient Information     Date Of Birth          1970        Visit Information        Provider Department      10/2/2017 11:40 AM Mikayla Nieves MD [facility]        Today's Diagnoses     Opioid use disorder, severe, on maintenance therapy, dependence (H)    -  1    Myofascial pain        Chronic pain syndrome        Encounter for long-term (current) use of high-risk medication           Follow-ups after your visit        Your next 10 appointments already scheduled     Nov 27, 2017 11:20 AM CST   Return Visit with Mikayla Nieves MD   [facility] ([facility]    [address] [facility]  [address] [place] [address] [phone]              Who to contact     If you have questions or need follow up information about today's clinic visit or your schedule please contact [facility] directly at [phone].  Normal or non-critical lab and imaging results will be communicated to you by Bitlyhart, letter or phone within 4 business days after the clinic has received the results. If you do not hear from us within 7 days, please contact the clinic through [employer] or phone. If you have a critical or abnormal lab result, we will notify you by phone as soon as possible.  Submit refill requests through SnapOne or call your pharmacy and they will forward the refill request to us. Please allow 3 business days for your refill to be completed.          Additional Information About Your Visit        MyChart Information     SnapOne gives you secure access to your electronic health record. If you see a primary care provider, you can also send messages to your care team and make appointments. If you have questions, please call your primary care clinic.  If you do not have a primary care provider, please call [phone]  and they will assist you.        Care EveryWhere ID     This is your Care EveryWhere ID. This could be used by other organizations to access your [facility] medical records  [id number]        Your Vitals Were     Pulse Temperature Respirations Height Pulse Oximetry BMI (Body Mass Index)    77 97.5  F (36.4  C) (Oral) 12 6' (1.829 m) 100% 29.84 kg/m2       Blood Pressure from Last 3 Encounters:   10/02/17 120/80   08/25/17 130/82   06/16/17 130/90    Weight from Last 3 Encounters:   10/02/17 220 lb (99.8 kg)   05/19/17 217 lb (98.4 kg)   03/27/17 217 lb (98.4 kg)              Today, you had the following     No orders found for display         Today's Medication Changes          These changes are accurate as of: 10/2/17 12:55 PM.  If you have any questions, ask your nurse or doctor.               These medicines have changed or have updated prescriptions.        Dose/Directions    amitriptyline 100 MG tablet   Commonly known as:  ELAVIL   This may have changed:    - when to take this  - reasons to take this   Used for:  Myofascial pain, Chronic pain syndrome        Dose:  100 mg   Take 1 tablet (100 mg) by mouth At Bedtime   Quantity:  30 tablet   Refills:  1       buprenorphine HCl-naloxone HCl 8-2 MG per film   Commonly known as:  SUBOXONE   This may have changed:  additional instructions   Used for:  Opioid use disorder, severe, on maintenance therapy, dependence (H), Encounter for long-term (current) use of high-risk medication        Dose:  1 Film   Place 1 Film under the tongue 3 times daily   Quantity:  90 Film   Refills:  1            Where to get your medicines      These medications were sent to [id number] - [place], [place] - [address]  [address] [address]    Hours:  Closed 10-16-08 business to [facility] and Home Phone:  [phone]     amitriptyline 100 MG tablet    cyclobenzaprine 10 MG tablet         Some of these will need a paper prescription and others can be  bought over the counter.  Ask your nurse if you have questions.     Bring a paper prescription for each of these medications     buprenorphine HCl-naloxone HCl 8-2 MG per film                Primary Care Provider Office Phone # Fax #    Irene Shultz [phone]       [address] [facility] [address]        Goals        General    I want to start CD treatment program this year.  started 11-19-15 (pt-stated)     Notes - Note created  11/19/2015 12:55 PM by Michelle Blanca LSW    As of today's date 11/19/2015 goal is met at 26 - 50%.   Goal Status:  Active   Assessment completed at [facility]     [facility]: Hadii michael allen hadasho Somoise, waaxda luqadaha, qaybta kaalmada ademonicayada, rosario wilcox . So [facility] [phone].    ATENCIÓN: Si habla español, tiene a rogel disposición servicios gratuitos de asistencia lingüística. Llame al [phone].    We comply with applicable federal civil rights laws and [place] laws. We do not discriminate on the basis of race, color, national origin, age, disability, sex, sexual orientation, or gender identity.            Thank you!     Thank you for choosing [facility]  for your care. Our goal is always to provide you with excellent care. Hearing back from our patients is one way we can continue to improve our services. Please take a few minutes to complete the written survey that you may receive in the mail after your visit with us. Thank you!             Your Updated Medication List - Protect others around you: Learn how to safely use, store and throw away your medicines at www.disposemymeds.org.          This list is accurate as of: 10/2/17 12:55 PM.  Always use your most recent med list.                   Brand Name Dispense Instructions for use Diagnosis    amitriptyline 100 MG tablet    ELAVIL    30 tablet    Take 1 tablet (100 mg) by mouth At Bedtime     Myofascial pain, Chronic pain syndrome       betamethasone valerate 0.1 % cream    VALISONE    15 g    Apply topically 2 times daily    Dermatitis       buprenorphine HCl-naloxone HCl 8-2 MG per film    SUBOXONE    90 Film    Place 1 Film under the tongue 3 times daily    Opioid use disorder, severe, on maintenance therapy, dependence (H), Encounter for long-term (current) use of high-risk medication       carvedilol 25 MG tablet    COREG    180 tablet    TAKE 1 TABLET TWICE A DAY WITH MEALS    HTN, goal below 140/90       cyclobenzaprine 10 MG tablet    FLEXERIL    90 tablet    Take 1 tablet (10 mg) by mouth 3 times daily as needed for muscle spasms    Myofascial pain       diazepam 5 MG tablet    VALIUM    60 tablet    Take 1 tablet (5 mg) by mouth 2 times daily as needed Needs an office visit for further refills    Chronic bilateral low back pain without sciatica       folic acid 1 MG tablet    FOLVITE    90 tablet    TAKE 1 TABLET DAILY    Alcohol abuse       hydrochlorothiazide 25 MG tablet    HYDRODIURIL    90 tablet    TAKE 1 TABLET DAILY    HTN, goal below 140/90       levETIRAcetam 1000 MG Tabs     180 tablet    TAKE 1 TABLET TWICE A DAY    Seizure disorder (H)       sertraline 100 MG tablet    ZOLOFT    180 tablet    TAKE 2 TABLETS DAILY    Major depressive disorder, recurrent episode, moderate (H)       triamcinolone 0.1 % cream    KENALOG    30 g    Apply sparingly to affected area three times daily for 14 days.    Contact dermatitis due to poison ivy

## 2017-10-02 NOTE — PROGRESS NOTES
"                          [facility]    Date of visit: 10/02/2017    Chief complaint:   Chief Complaint   Patient presents with     Drug Problem     Interval history:  Torey Coreas is a 47 year old male here for Chronic Pain and Addiction/Suboxone follow up.   Last visit: 8/25/2017    CURRENT DOSE: 8mg TID  BRIEF HPI: The patient is a 47 year old male who was referred to me by his chronic pain provider, Kaye Nelson CNP and his PCP when it became evident that he was abusing alcohol and had an unintentional opioid overdose.  He was on high dose opioids (210 morphine equivalents/day) for 6 years for chronic back pain - failed back syndrome s/p L5-S1 fusion in 2015. Agreed to detox and eventually was able to discontinue use on his own at home.  Suboxone was initiated on 2/17/2017.  He recently (8/15/2017) had a relapse with Et[employer].     Since his last visit, Torey Coreas reports:  - Has not been working at all for the last month.  Pain has been out of control.  He is not sleeping well and cannot work.  He is talking about going on disability.  Asking to go back on traditional opioids.  Asking for soma and valium (which he has been able to get from his PCP)   - He has been taking Elavil at night for sleep for the last year and he is requesting a refill on this.   - Went on a \"alcohol chaudhari\" the weekend of the 15th this month and on Monday checked himself into detox.  Called to notify me of this.  He was arrested and is dealing with the repercussions of this.  He was not able to have his suboxone at the detox in [place] and went a couple days without it.  He has been back on it since being discharged.   - He plans to do outpatient treatment.  [facility] [place], he will start next week.   - Surgeon Dr. Baxter - had lumbar surgery 2 years ago.  Last follow up was 6 months ago and he did not recommend more surgery.   - He has been saving some of his suboxone to take as needed.  This is " very helpful in managing his pain.     - going to an AA meeting he likes  - Family is supportive.  His wife was angry but continues to support and love him.     Medications for Pain:   Flexeril 10mg TID PRN   Elavil 100mg qhs PRN   Suboxone 8mg films TID  Valium 5mg BID  Zoloft 200mg daily      Status since last visit:    Since last visit patient has been: struggline.     Intensity:     There has been: mild craving.      Suboxone Dose: adequate   Progression of Symptoms:     Cues to use and relapse triggers: none    Recovery program has been: NONE   Accompanying Signs & Symptoms:    Side Effects: none.    Sobriety:     Status: no use since last visit.      Drug Screen: Not obtained at this visit.    Precipitating factors:    Triggers have been: non-existent.   Alleviating factors:    Contact with sponsor has been: no sponsor    Family and support system has been: helpful.   Other Therapies Tried :     Patient has been going to recovery meetings:sporadically.      Patient has been participating in professional counseling/therapy: NO    [place] Board of Pharmacy Data Base Reviewed:    YES; approprate    Pain scores:  Pain intensity on average is 6 on a scale of 0-10.     Side Effects: no side effect    Medications:  Current Outpatient Prescriptions   Medication Sig Dispense Refill     buprenorphine HCl-naloxone HCl (SUBOXONE) 8-2 MG per film Place 1 Film under the tongue 3 times daily May fill on/after 9/5/17 to start taking on/after 9/6/17 90 Film 0     cyclobenzaprine (FLEXERIL) 10 MG tablet Take 1 tablet (10 mg) by mouth 3 times daily as needed for muscle spasms 90 tablet 1     carvedilol (COREG) 25 MG tablet TAKE 1 TABLET TWICE A DAY WITH MEALS 180 tablet 0     folic acid (FOLVITE) 1 MG tablet TAKE 1 TABLET DAILY 90 tablet 0     levETIRAcetam 1000 MG TABS TAKE 1 TABLET TWICE A  tablet 0     hydrochlorothiazide (HYDRODIURIL) 25 MG tablet TAKE 1 TABLET DAILY 90 tablet 0     diazepam (VALIUM) 5 MG tablet  Take 1 tablet (5 mg) by mouth 2 times daily as needed Needs an office visit for further refills 60 tablet 0     sertraline (ZOLOFT) 100 MG tablet TAKE 2 TABLETS DAILY 180 tablet 0     amitriptyline (ELAVIL) 100 MG tablet Take 1 tablet (100 mg) by mouth nightly as needed for sleep       triamcinolone (KENALOG) 0.1 % cream Apply sparingly to affected area three times daily for 14 days. 30 g 0     betamethasone valerate (VALISONE) 0.1 % cream Apply topically 2 times daily 15 g 1       Medical History: any changes in medical history since they were last seen? No    OBJECTIVE:                                                    /80 (BP Location: Left arm, Cuff Size: Adult Large)  Pulse 77  Temp 97.5  F (36.4  C) (Oral)  Resp 12  Ht 6' (1.829 m)  Wt 220 lb (99.8 kg)  SpO2 100%  BMI 29.84 kg/m2  Body mass index is 29.84 kg/(m^2).     ROS:  Constitutional, neuro, ENT, endocrine, pulmonary, cardiac, gastrointestinal, genitourinary, musculoskeletal, integument and psychiatric systems are negative, except as otherwise noted.    EXAM:  GENERAL APPEARANCE: healthy, alert and no distress  EYES: Eyes grossly normal to inspection and conjunctivae and sclerae normal  SKIN: no suspicious lesions or rashes  PSYCH: mentation appears normal and affect normal/bright  MENTAL STATUS EXAM:  Appearance/Behavior: No apparent distress and Neatly groomed  Speech: Normal  Mood/Affect: normal affect  Insight: Adequate    Diagnostic Test Results:  No results found for this or any previous visit (from the past 24 hour(s)).       ASSESSMENT:                                                      OPIOID USE DISORDER   ALCOHOL USE DISORDER    ENCOUNTER FOR LONG TERM USE OF HIGH RISK MEDICATION   High Risk Drug Monitoring?  YES   Drug being monitored: Suboxone    Reason for drug: Opioid Use Disorder   What is being monitored?: Dosage, Cravings, Trigger, side effects, and continued abstinence.    CHRONIC LOW BACK PAIN - FAILED BACK  SYNDROME  ANXIETY    PLAN:                                                      COUNSELING: We discussed addiction and relapse prevention.  He plans to do outpatient treatment and is dealing with the legal aftereffects of his behavior.   His family remains supportive and he seems to be ready to get back on track with his recovery after this relapse with alcohol.  He remains in denial that he has any addiction and this remains a barrier to care.  This is despite a recent opioid overdose and alcohol use resulting in loss of control and legal complications. He seems to think he can use medications for pain and to alter his mood and he can control this use.     OTHER: Chronic pain specific PT and therapy was offered and the patient refused.  Discussed referral for a spinal cord stimulator and he is not interested.  States he has had many injections in the past that were not helpful for his pain.  He remains fixated on opioids being the answer to his pain.       ICD-10-CM    1. Opioid use disorder, severe, on maintenance therapy, dependence (H) F11.20 buprenorphine HCl-naloxone HCl (SUBOXONE) 8-2 MG per film   2. Myofascial pain M79.1 cyclobenzaprine (FLEXERIL) 10 MG tablet     amitriptyline (ELAVIL) 100 MG tablet   3. Chronic pain syndrome G89.4 amitriptyline (ELAVIL) 100 MG tablet   4. Encounter for long-term (current) use of high-risk medication Z79.899 buprenorphine HCl-naloxone HCl (SUBOXONE) 8-2 MG per film       MEDICATIONS:   Orders Placed This Encounter   Medications     cyclobenzaprine (FLEXERIL) 10 MG tablet     Sig: Take 1 tablet (10 mg) by mouth 3 times daily as needed for muscle spasms     Dispense:  90 tablet     Refill:  1     amitriptyline (ELAVIL) 100 MG tablet     Sig: Take 1 tablet (100 mg) by mouth At Bedtime     Dispense:  30 tablet     Refill:  1     buprenorphine HCl-naloxone HCl (SUBOXONE) 8-2 MG per film     Sig: Place 1 Film under the tongue 3 times daily     Dispense:  90 Film     Refill:  1           - Continue other medications without change    FUTURE APPOINTMENTS:       - Follow-up visit in 8 weeks - November 27th, 2017     Total time spent was 30 minutes, and more than 50% of face to face time was spent in counseling and/or coordination of care.      Mikayla Warren

## 2017-11-27 ENCOUNTER — OFFICE VISIT (OUTPATIENT)
Dept: ADDICTION MEDICINE | Facility: CLINIC | Age: 47
End: 2017-11-27
Payer: COMMERCIAL

## 2017-11-27 VITALS
HEART RATE: 88 BPM | BODY MASS INDEX: 32.28 KG/M2 | SYSTOLIC BLOOD PRESSURE: 128 MMHG | OXYGEN SATURATION: 100 % | WEIGHT: 238 LBS | DIASTOLIC BLOOD PRESSURE: 86 MMHG | RESPIRATION RATE: 16 BRPM

## 2017-11-27 DIAGNOSIS — F10.20 ALCOHOL USE DISORDER, SEVERE, DEPENDENCE (H): ICD-10-CM

## 2017-11-27 DIAGNOSIS — M54.5 CHRONIC MIDLINE LOW BACK PAIN, WITH SCIATICA PRESENCE UNSPECIFIED: ICD-10-CM

## 2017-11-27 DIAGNOSIS — M79.18 MYOFASCIAL PAIN: ICD-10-CM

## 2017-11-27 DIAGNOSIS — Z79.899 ENCOUNTER FOR LONG-TERM (CURRENT) USE OF HIGH-RISK MEDICATION: ICD-10-CM

## 2017-11-27 DIAGNOSIS — F11.20 UNCOMPLICATED OPIOID DEPENDENCE (H): Primary | ICD-10-CM

## 2017-11-27 DIAGNOSIS — G89.29 CHRONIC MIDLINE LOW BACK PAIN, WITH SCIATICA PRESENCE UNSPECIFIED: ICD-10-CM

## 2017-11-27 DIAGNOSIS — G89.4 CHRONIC PAIN SYNDROME: ICD-10-CM

## 2017-11-27 DIAGNOSIS — S46.002A ROTATOR CUFF INJURY, LEFT, INITIAL ENCOUNTER: ICD-10-CM

## 2017-11-27 LAB
AMPHETAMINES UR QL: NOT DETECTED NG/ML
BARBITURATES UR QL SCN: NOT DETECTED NG/ML
BENZODIAZ UR QL SCN: NOT DETECTED NG/ML
BUPRENORPHINE UR QL: ABNORMAL NG/ML
CANNABINOIDS UR QL: NOT DETECTED NG/ML
COCAINE UR QL SCN: NOT DETECTED NG/ML
D-METHAMPHET UR QL: NOT DETECTED NG/ML
METHADONE UR QL SCN: NOT DETECTED NG/ML
OPIATES UR QL SCN: NOT DETECTED NG/ML
OXYCODONE UR QL SCN: NOT DETECTED NG/ML
PCP UR QL SCN: NOT DETECTED NG/ML
PROPOXYPH UR QL: NOT DETECTED NG/ML
TRICYCLICS UR QL SCN: ABNORMAL NG/ML

## 2017-11-27 PROCEDURE — 80306 DRUG TEST PRSMV INSTRMNT: CPT | Performed by: ANESTHESIOLOGY

## 2017-11-27 PROCEDURE — 99214 OFFICE O/P EST MOD 30 MIN: CPT | Performed by: ANESTHESIOLOGY

## 2017-11-27 RX ORDER — AMITRIPTYLINE HYDROCHLORIDE 100 MG/1
100 TABLET ORAL AT BEDTIME
Qty: 30 TABLET | Refills: 1 | Status: SHIPPED | OUTPATIENT
Start: 2017-11-27 | End: 2018-05-04

## 2017-11-27 RX ORDER — CYCLOBENZAPRINE HCL 10 MG
10 TABLET ORAL 3 TIMES DAILY PRN
Qty: 90 TABLET | Refills: 1 | Status: SHIPPED | OUTPATIENT
Start: 2017-11-27

## 2017-11-27 RX ORDER — BUPRENORPHINE AND NALOXONE 8; 2 MG/1; MG/1
1 FILM, SOLUBLE BUCCAL; SUBLINGUAL 3 TIMES DAILY
Qty: 90 FILM | Refills: 1 | Status: SHIPPED | OUTPATIENT
Start: 2017-11-27 | End: 2018-05-04

## 2017-11-27 NOTE — PATIENT INSTRUCTIONS
1. Call to schedule your MRI:     [facility]: Please call to schedule: [phone]    2. Referral for Ortho for your shoulder  The  Representative will call you within 1 business day to help schedule your appointment, or you may contact the  Representative at:    All areas ~ [phone]      Mikayla Nieves MD

## 2017-11-27 NOTE — MR AVS SNAPSHOT
After Visit Summary   11/27/2017    Torey Coreas    MRN: [id number]           Patient Information     Date Of Birth          1970        Visit Information        Provider Department      11/27/2017 11:20 AM Mikayla Nieves MD [facility]        Today's Diagnoses     Rotator cuff injury, left, initial encounter    -  1    Encounter for long-term (current) use of high-risk medication        Opioid use disorder, severe, on maintenance therapy, dependence (H)        Myofascial pain        Chronic pain syndrome          Care Instructions    1. Call to schedule your MRI:     [facility]: Please call to schedule: [phone]    2. Referral for Ortho for your shoulder  The  Representative will call you within 1 business day to help schedule your appointment, or you may contact the  Representative at:    All areas ~ [phone]      Mikayla Nieves MD             Follow-ups after your visit        Additional Services     ORTHO  REFERRAL       [facility] is referring you to the Orthopedic  Services at [facility].       The  Representative will assist you in the coordination of your Orthopedic and Musculoskeletal Care as prescribed by your physician.    The  Representative will call you within 1 business day to help schedule your appointment, or you may contact the  Representative at:    All areas ~ [phone]     Type of Referral : Surgical / Specialist - Left rotator cuff injury - MRI ordered to be done prior to consult      Timeframe requested: Routine    Coverage of these services is subject to the terms and limitations of your health insurance plan.  Please call member services at your health plan with any benefit or coverage questions.      If X-rays, CT or MRI's have been performed, please contact the facility where they were done to arrange for  , prior to your scheduled appointment.  Please bring this referral request to your appointment and present it to your specialist.                  Future tests that were ordered for you today     Open Future Orders        Priority Expected Expires Ordered    MR Shoulder Left w/o & w Contrast Routine  11/27/2018 11/27/2017            Who to contact     If you have questions or need follow up information about today's clinic visit or your schedule please contact [facility] directly at [phone].  Normal or non-critical lab and imaging results will be communicated to you by [facility], letter or phone within 4 business days after the clinic has received the results. If you do not hear from us within 7 days, please contact the clinic through [employer] or phone. If you have a critical or abnormal lab result, we will notify you by phone as soon as possible.  Submit refill requests through [employer] or call your pharmacy and they will forward the refill request to us. Please allow 3 business days for your refill to be completed.          Additional Information About Your Visit        [employer] Information     [employer] gives you secure access to your electronic health record. If you see a primary care provider, you can also send messages to your care team and make appointments. If you have questions, please call your primary care clinic.  If you do not have a primary care provider, please call [phone] and they will assist you.        Care EveryWhere ID     This is your Care EveryWhere ID. This could be used by other organizations to access your [facility] medical records  [id number]        Your Vitals Were     Pulse Respirations Pulse Oximetry BMI (Body Mass Index)          88 16 100% 32.28 kg/m2         Blood Pressure from Last 3 Encounters:   11/27/17 128/86   10/02/17 120/80   08/25/17 130/82    Weight from Last 3 Encounters:   11/27/17 238 lb (108 kg)   10/02/17 220 lb (99.8 kg)    05/19/17 217 lb (98.4 kg)              We Performed the Following     ORTHO  REFERRAL     Urine Drugs of Abuse Screen Panel 13          Where to get your medicines      These medications were sent to [id number] - [place], [place] - [address]  [address] [facility] [address]    Hours:  Closed 10-16-08 business to [facility] Phone:  [phone]     amitriptyline 100 MG tablet    cyclobenzaprine 10 MG tablet         Some of these will need a paper prescription and others can be bought over the counter.  Ask your nurse if you have questions.     Bring a paper prescription for each of these medications     buprenorphine HCl-naloxone HCl 8-2 MG per film          Primary Care Provider Office Phone # Fax #    Irene Shultz, [phone]       [address] [facility] [address]        Goals        General    I want to start CD treatment program this year.  started 11-19-15 (pt-stated)     Notes - Note created  11/19/2015 12:55 PM by Michelle Blanca LSW    As of today's date 11/19/2015 goal is met at 26 - 50%.   Goal Status:  Active   Assessment completed at [facility] Access to Services     [facility] AH: Hadii michael allen hadasho Soomaali, waaxda luqadaha, qaybta kaalmada adeegyada, rosario keith. So [facility] [phone].    ATENCIÓN: Si habla español, tiene a rogel disposición servicios gratuitos de asistencia lingüística. Manjit al [phone].    We comply with applicable federal civil rights laws and [place] laws. We do not discriminate on the basis of race, color, national origin, age, disability, sex, sexual orientation, or gender identity.            Thank you!     Thank you for choosing [facility]  for your care. Our goal is always to provide you with excellent care. Hearing back from our patients is one way we can continue to improve our services. Please take a few minutes to  complete the written survey that you may receive in the mail after your visit with us. Thank you!             Your Updated Medication List - Protect others around you: Learn how to safely use, store and throw away your medicines at www.disposemymeds.org.          This list is accurate as of: 11/27/17 11:42 AM.  Always use your most recent med list.                   Brand Name Dispense Instructions for use Diagnosis    amitriptyline 100 MG tablet    ELAVIL    30 tablet    Take 1 tablet (100 mg) by mouth At Bedtime    Myofascial pain, Chronic pain syndrome       betamethasone valerate 0.1 % cream    VALISONE    15 g    Apply topically 2 times daily    Dermatitis       buprenorphine HCl-naloxone HCl 8-2 MG per film    SUBOXONE    90 Film    Place 1 Film under the tongue 3 times daily    Opioid use disorder, severe, on maintenance therapy, dependence (H), Encounter for long-term (current) use of high-risk medication       carvedilol 25 MG tablet    COREG    180 tablet    TAKE 1 TABLET TWICE A DAY WITH MEALS    HTN, goal below 140/90       cyclobenzaprine 10 MG tablet    FLEXERIL    90 tablet    Take 1 tablet (10 mg) by mouth 3 times daily as needed for muscle spasms    Myofascial pain       diazepam 5 MG tablet    VALIUM    60 tablet    Take 1 tablet (5 mg) by mouth 2 times daily as needed Needs an office visit for further refills    Chronic bilateral low back pain without sciatica       folic acid 1 MG tablet    FOLVITE    90 tablet    TAKE 1 TABLET DAILY    Alcohol abuse       hydrochlorothiazide 25 MG tablet    HYDRODIURIL    90 tablet    TAKE 1 TABLET DAILY    HTN, goal below 140/90       levETIRAcetam 1000 MG Tabs     180 tablet    TAKE 1 TABLET TWICE A DAY    Seizure disorder (H)       sertraline 100 MG tablet    ZOLOFT    180 tablet    TAKE 2 TABLETS DAILY    Major depressive disorder, recurrent episode, moderate (H)       triamcinolone 0.1 % cream    KENALOG    30 g    Apply sparingly to affected area three  times daily for 14 days.    Contact dermatitis due to poison ivy

## 2017-11-27 NOTE — PROGRESS NOTES
"                          [facility]    Date of visit: 11/27/2017    Chief complaint:   Chief Complaint   Patient presents with     Pain     Interval history:  Torey Coreas is a 47 year old male here for Chronic Pain and Addiction/Suboxone follow up.   Last visit: 10/02/2017    CURRENT DOSE: 8mg TID  BRIEF HPI: The patient is a 47 year old male who was referred to me by his chronic pain provider, Kaye Nelson CNP and his PCP when it became evident that he was abusing alcohol.  He was on high dose opioids (210 morphine equivalents/day) for 6 years for chronic back pain - failed back syndrome s/p L5-S1 fusion in 2015. Agreed to detox and eventually was able to discontinue use on his own at home.  Suboxone was initiated on 2/17/2017.  He recently (8/15/2017) had a relapse with [employer].     Since his last visit, Torey Coreas reports:  - Has not been working at all for the last month.  Pain has been out of control.  He is not sleeping well and cannot work.  He is talking about going on disability.  Asking to go back on traditional opioids.  Asking for soma and valium (which he has been able to get from his PCP)   - His left shoulder has gotten worse and he can no longer lift it.  He would like another MRI and a consult with ORTHO  - He wants the diagnosis \"opioid use disorder\" off his record.  He denies ever misusing them and states he was \"the model\" patient.  When confronted about an unintentional opioid overdose which documented in the chart 5/2015, he denies this.  He states he has only ever been in detox for alchol.    - He has been taking Elavil at night for sleep for the last year and he is requesting a refill on this.   - Went on a \"alcohol chaudhari\" the weekend of the 15th this month and on Monday checked himself into detox.  Called to notify me of this.  He was arrested and is dealing with the repercussions of this.  He was not able to have his suboxone at the detox in [place] and " went a couple days without it.  He has been back on it since being discharged.   - Continues to do outpatient treatment at [facility] in [place].   - Surgeon Dr. Baxter - had lumbar surgery 2 years ago.  He is wondering if he should get another MRI.    - He has been saving some of his suboxone to take as needed.  However, this is not longer helpful in managing his pain.     - going to a weekly AA meeting he likes  - Family is supportive.  His wife was angry but continues to support and love him.     Medications for Pain:   Flexeril 10mg TID PRN   Elavil 100mg qhs PRN   Suboxone 8mg films TID  Valium 5mg BID  Zoloft 200mg daily      Status since last visit:    Since last visit patient has been: struggling.     Intensity:     There has been: mild craving.      Suboxone Dose: adequate   Progression of Symptoms:     Cues to use and relapse triggers: none    Recovery program has been: in outpatient treatment   Accompanying Signs & Symptoms:    Side Effects: none.    Sobriety:     Status: no use since last visit.      Drug Screen:  obtained.    Precipitating factors:    Triggers have been: non-existent.   Alleviating factors:    Contact with sponsor has been: no sponsor    Family and support system has been: helpful.   Other Therapies Tried :     Patient has been going to recovery meetings:sporadically.      Patient has been participating in professional counseling/therapy: NO    [place] Board of Pharmacy Data Base Reviewed:    YES; approprate    Pain scores:  Pain intensity on average is 8 on a scale of 0-10.     Side Effects: no side effect    Medications:  Current Outpatient Prescriptions   Medication Sig Dispense Refill     sertraline (ZOLOFT) 100 MG tablet TAKE 2 TABLETS DAILY 180 tablet 1     cyclobenzaprine (FLEXERIL) 10 MG tablet Take 1 tablet (10 mg) by mouth 3 times daily as needed for muscle spasms 90 tablet 1     amitriptyline (ELAVIL) 100 MG tablet Take 1 tablet (100 mg) by mouth At Bedtime 30 tablet 1      buprenorphine HCl-naloxone HCl (SUBOXONE) 8-2 MG per film Place 1 Film under the tongue 3 times daily 90 Film 1     carvedilol (COREG) 25 MG tablet TAKE 1 TABLET TWICE A DAY WITH MEALS 180 tablet 0     folic acid (FOLVITE) 1 MG tablet TAKE 1 TABLET DAILY 90 tablet 0     levETIRAcetam 1000 MG TABS TAKE 1 TABLET TWICE A  tablet 0     hydrochlorothiazide (HYDRODIURIL) 25 MG tablet TAKE 1 TABLET DAILY 90 tablet 0     diazepam (VALIUM) 5 MG tablet Take 1 tablet (5 mg) by mouth 2 times daily as needed Needs an office visit for further refills 60 tablet 0     triamcinolone (KENALOG) 0.1 % cream Apply sparingly to affected area three times daily for 14 days. 30 g 0     betamethasone valerate (VALISONE) 0.1 % cream Apply topically 2 times daily 15 g 1       Medical History: any changes in medical history since they were last seen? No    OBJECTIVE:                                                    /86  Pulse 88  Resp 16  Wt 238 lb (108 kg)  SpO2 100%  BMI 32.28 kg/m2  Body mass index is 32.28 kg/(m^2).     ROS:  Constitutional, neuro, ENT, endocrine, pulmonary, cardiac, gastrointestinal, genitourinary, musculoskeletal, integument and psychiatric systems are negative, except as otherwise noted.    EXAM:  GENERAL APPEARANCE: healthy, alert and no distress  EYES: Eyes grossly normal to inspection and conjunctivae and sclerae normal  SKIN: no suspicious lesions or rashes  PSYCH: mentation appears normal and affect normal/bright  MENTAL STATUS EXAM:  Appearance/Behavior: No apparent distress and Neatly groomed  Speech: Normal  Mood/Affect: normal affect  Insight: Adequate    Diagnostic Test Results:  Results for orders placed or performed in visit on 11/27/17 (from the past 24 hour(s))   Urine Drugs of Abuse Screen Panel 13   Result Value Ref Range    Cannabinoids (10-tfd-7-carboxy-9-THC) Not Detected NDET^Not Detected ng/mL    Phencyclidine (Phencyclidine) Not Detected NDET^Not Detected ng/mL    Cocaine  (Benzoylecgonine) Not Detected NDET^Not Detected ng/mL    Methamphetamine (d-Methamphetamine) Not Detected NDET^Not Detected ng/mL    Opiates (Morphine) Not Detected NDET^Not Detected ng/mL    Amphetamine (d-Amphetamine) Not Detected NDET^Not Detected ng/mL    Benzodiazepines (Nordiazepam) Not Detected NDET^Not Detected ng/mL    Tricyclic Antidepressants (Desipramine) Detected, Abnormal Result (A) NDET^Not Detected ng/mL    Methadone (Methadone) Not Detected NDET^Not Detected ng/mL    Barbiturates (Butalbital) Not Detected NDET^Not Detected ng/mL    Oxycodone (Oxycodone) Not Detected NDET^Not Detected ng/mL    Propoxyphene (Norpropoxyphene) Not Detected NDET^Not Detected ng/mL    Buprenorphine (Buprenorphine) Detected, Abnormal Result (A) NDET^Not Detected ng/mL          ASSESSMENT:                                                      OPIOID DEPENDENCE - UNCOMPLICATED  ALCOHOL USE DISORDER - SEVERE    ENCOUNTER FOR LONG TERM USE OF HIGH RISK MEDICATION   High Risk Drug Monitoring?  YES   Drug being monitored: Suboxone    Reason for drug: Opioid Use Disorder   What is being monitored?: Dosage, Cravings, Trigger, side effects, and continued abstinence.    CHRONIC LOW BACK PAIN - FAILED BACK SYNDROME  ANXIETY - prescribed daily valium by his PCP  ACUTE LEFT SHOULDER PAIN    PLAN:                                                      COUNSELING: We discussed addiction and relapse prevention.  He continues court ordered outpatient treatment and is dealing with the legal aftereffects of his behavior.   His family remains supportive and he seems to be ready to get back on track with his recovery after this relapse with alcohol.      OTHER: Chronic pain specific PT and therapy was offered and the patient refused.  Discussed referral for a spinal cord stimulator and he is not interested.  States he has had many injections in the past that were not helpful for his pain.  He remains fixated on opioids being the answer to his  pain. He remains in denial that he has any addiction and this remains a barrier to care.  He has a lack of insight. This is despite alcohol use resulting in hospitalization for PRES, loss of control and legal complications.     MRI of the left shoulder has been ordered and a referral for Ortho placed.        ICD-10-CM    1. Uncomplicated opioid dependence (H) F11.20 Urine Drugs of Abuse Screen Panel 13   2. Encounter for long-term (current) use of high-risk medication Z79.899 Urine Drugs of Abuse Screen Panel 13     buprenorphine HCl-naloxone HCl (SUBOXONE) 8-2 MG per film     Urine Drugs of Abuse Screen Panel 13   3. Rotator cuff injury, left, initial encounter S46.002A MR Shoulder Left w/o & w Contrast     ORTHO  REFERRAL   4. Myofascial pain M79.1 amitriptyline (ELAVIL) 100 MG tablet     cyclobenzaprine (FLEXERIL) 10 MG tablet   5. Chronic pain syndrome G89.4 amitriptyline (ELAVIL) 100 MG tablet   6. Chronic midline low back pain, with sciatica presence unspecified M54.5     G89.29    7. Alcohol use disorder, severe, dependence (H) F10.20        MEDICATIONS:   Because of his problematic drinking I do not recommend going back on traditional opioids.  I have made this clear during today's visit.      Orders Placed This Encounter   Medications     buprenorphine HCl-naloxone HCl (SUBOXONE) 8-2 MG per film     Sig: Place 1 Film under the tongue 3 times daily     Dispense:  90 Film     Refill:  1     amitriptyline (ELAVIL) 100 MG tablet     Sig: Take 1 tablet (100 mg) by mouth At Bedtime     Dispense:  30 tablet     Refill:  1     cyclobenzaprine (FLEXERIL) 10 MG tablet     Sig: Take 1 tablet (10 mg) by mouth 3 times daily as needed for muscle spasms     Dispense:  90 tablet     Refill:  1          - Continue other medications without change    FUTURE APPOINTMENTS:       - Follow-up visit in 8 weeks - November 27th, 2017     Total time spent was 30 minutes, and more than 50% of face to face time was spent in  counseling and/or coordination of care.      Mikayla Warren

## 2017-11-28 ENCOUNTER — OFFICE VISIT (OUTPATIENT)
Dept: FAMILY MEDICINE | Facility: CLINIC | Age: 47
End: 2017-11-28
Payer: COMMERCIAL

## 2017-11-28 VITALS
HEART RATE: 80 BPM | DIASTOLIC BLOOD PRESSURE: 86 MMHG | SYSTOLIC BLOOD PRESSURE: 130 MMHG | RESPIRATION RATE: 16 BRPM | BODY MASS INDEX: 31.19 KG/M2 | TEMPERATURE: 97 F | WEIGHT: 230 LBS

## 2017-11-28 DIAGNOSIS — M54.50 CHRONIC BILATERAL LOW BACK PAIN WITHOUT SCIATICA: ICD-10-CM

## 2017-11-28 DIAGNOSIS — G40.909 SEIZURE DISORDER (H): ICD-10-CM

## 2017-11-28 DIAGNOSIS — G89.29 CHRONIC BILATERAL LOW BACK PAIN WITHOUT SCIATICA: ICD-10-CM

## 2017-11-28 DIAGNOSIS — F10.10 ALCOHOL ABUSE: ICD-10-CM

## 2017-11-28 DIAGNOSIS — F33.1 MAJOR DEPRESSIVE DISORDER, RECURRENT EPISODE, MODERATE (H): ICD-10-CM

## 2017-11-28 DIAGNOSIS — I10 HTN, GOAL BELOW 140/90: ICD-10-CM

## 2017-11-28 DIAGNOSIS — Z23 NEED FOR PROPHYLACTIC VACCINATION AND INOCULATION AGAINST INFLUENZA: Primary | ICD-10-CM

## 2017-11-28 PROCEDURE — 90471 IMMUNIZATION ADMIN: CPT | Performed by: NURSE PRACTITIONER

## 2017-11-28 PROCEDURE — 99214 OFFICE O/P EST MOD 30 MIN: CPT | Mod: 25 | Performed by: NURSE PRACTITIONER

## 2017-11-28 PROCEDURE — 90686 IIV4 VACC NO PRSV 0.5 ML IM: CPT | Performed by: NURSE PRACTITIONER

## 2017-11-28 RX ORDER — SERTRALINE HYDROCHLORIDE 100 MG/1
200 TABLET, FILM COATED ORAL DAILY
Qty: 180 TABLET | Refills: 1 | Status: SHIPPED | OUTPATIENT
Start: 2017-11-28 | End: 2018-03-15

## 2017-11-28 RX ORDER — LEVETIRACETAM 1000 MG/1
1 TABLET ORAL 2 TIMES DAILY
Qty: 180 TABLET | Refills: 1 | Status: SHIPPED | OUTPATIENT
Start: 2017-11-28 | End: 2018-05-04

## 2017-11-28 RX ORDER — DIAZEPAM 5 MG
5 TABLET ORAL 2 TIMES DAILY PRN
Qty: 60 TABLET | Refills: 5 | Status: SHIPPED | OUTPATIENT
Start: 2017-11-28 | End: 2018-05-04

## 2017-11-28 RX ORDER — CARVEDILOL 25 MG/1
TABLET ORAL
Qty: 180 TABLET | Refills: 1 | Status: SHIPPED | OUTPATIENT
Start: 2017-11-28 | End: 2018-05-04

## 2017-11-28 RX ORDER — FOLIC ACID 1 MG/1
1000 TABLET ORAL DAILY
Qty: 90 TABLET | Refills: 3 | Status: SHIPPED | OUTPATIENT
Start: 2017-11-28 | End: 2018-05-04

## 2017-11-28 RX ORDER — HYDROCHLOROTHIAZIDE 25 MG/1
25 TABLET ORAL DAILY
Qty: 90 TABLET | Refills: 1 | Status: SHIPPED | OUTPATIENT
Start: 2017-11-28 | End: 2018-05-04

## 2017-11-28 ASSESSMENT — PATIENT HEALTH QUESTIONNAIRE - PHQ9
SUM OF ALL RESPONSES TO PHQ QUESTIONS 1-9: 3
5. POOR APPETITE OR OVEREATING: NOT AT ALL

## 2017-11-28 ASSESSMENT — ANXIETY QUESTIONNAIRES
6. BECOMING EASILY ANNOYED OR IRRITABLE: NOT AT ALL
7. FEELING AFRAID AS IF SOMETHING AWFUL MIGHT HAPPEN: NOT AT ALL
3. WORRYING TOO MUCH ABOUT DIFFERENT THINGS: SEVERAL DAYS
5. BEING SO RESTLESS THAT IT IS HARD TO SIT STILL: NOT AT ALL
1. FEELING NERVOUS, ANXIOUS, OR ON EDGE: SEVERAL DAYS
GAD7 TOTAL SCORE: 2
2. NOT BEING ABLE TO STOP OR CONTROL WORRYING: NOT AT ALL

## 2017-11-28 NOTE — NURSING NOTE
Chief Complaint   Patient presents with     Hypertension     Anxiety       Initial /86  Pulse 80  Temp 97  F (36.1  C) (Tympanic)  Resp 16  Wt 230 lb (104.3 kg)  BMI 31.19 kg/m2 Estimated body mass index is 31.19 kg/(m^2) as calculated from the following:    Height as of 10/2/17: 6' (1.829 m).    Weight as of this encounter: 230 lb (104.3 kg).  Medication Reconciliation: complete    Health Maintenance that is potentially due pending provider review:  NONE    n/a    Is there anyone who you would like to be able to receive your results? No  If yes have patient fill out LISA

## 2017-11-28 NOTE — MR AVS SNAPSHOT
After Visit Summary   11/28/2017    Torey Coreas    MRN: [id number]           Patient Information     Date Of Birth          1970        Visit Information        Provider Department      11/28/2017 5:40 PM Irene Shultz NP [facility]        Today's Diagnoses     Seizure disorder (H)        Chronic bilateral low back pain without sciatica        HTN, goal below 140/90        Major depressive disorder, recurrent episode, moderate (H)        Alcohol abuse          Care Instructions    Refilled medications   Stay in treatment as you doing     Follow up with shoulder specialist after MRI and pain specialist for further management               Follow-ups after your visit        Future tests that were ordered for you today     Open Standing Orders        Priority Remaining Interval Expires Ordered    Urine Drugs of Abuse Screen Panel 13 Routine 100/100  11/27/2018 11/27/2017          Open Future Orders        Priority Expected Expires Ordered    MR Shoulder Left w/o & w Contrast Routine  11/27/2018 11/27/2017            Who to contact     If you have questions or need follow up information about today's clinic visit or your schedule please contact [facility] directly at [phone].  Normal or non-critical lab and imaging results will be communicated to you by [employer], letter or phone within 4 business days after the clinic has received the results. If you do not hear from us within 7 days, please contact the clinic through [employer] or phone. If you have a critical or abnormal lab result, we will notify you by phone as soon as possible.  Submit refill requests through [employer] or call your pharmacy and they will forward the refill request to us. Please allow 3 business days for your refill to be completed.          Additional Information About Your Visit        [employer] Information     [employer] gives you secure access to your electronic health record. If you see a  primary care provider, you can also send messages to your care team and make appointments. If you have questions, please call your primary care clinic.  If you do not have a primary care provider, please call [phone] and they will assist you.        Care EveryWhere ID     This is your Care EveryWhere ID. This could be used by other organizations to access your [facility] medical records  [id number]        Your Vitals Were     Pulse Temperature Respirations BMI (Body Mass Index)          80 97  F (36.1  C) (Tympanic) 16 31.19 kg/m2         Blood Pressure from Last 3 Encounters:   11/28/17 130/86   11/27/17 128/86   10/02/17 120/80    Weight from Last 3 Encounters:   11/28/17 230 lb (104.3 kg)   11/27/17 238 lb (108 kg)   10/02/17 220 lb (99.8 kg)              Today, you had the following     No orders found for display         Today's Medication Changes          These changes are accurate as of: 11/28/17  6:11 PM.  If you have any questions, ask your nurse or doctor.               These medicines have changed or have updated prescriptions.        Dose/Directions    carvedilol 25 MG tablet   Commonly known as:  COREG   This may have changed:  See the new instructions.   Used for:  HTN, goal below 140/90   Changed by:  Irene Shultz NP        TAKE 1 TABLET TWICE A DAY WITH MEALS   Quantity:  180 tablet   Refills:  1       diazepam 5 MG tablet   Commonly known as:  VALIUM   This may have changed:  additional instructions   Used for:  Chronic bilateral low back pain without sciatica   Changed by:  Irene Shultz NP        Dose:  5 mg   Take 1 tablet (5 mg) by mouth 2 times daily as needed   Quantity:  60 tablet   Refills:  5       folic acid 1 MG tablet   Commonly known as:  FOLVITE   This may have changed:  See the new instructions.   Used for:  Alcohol abuse   Changed by:  Irene Shultz NP        Dose:  1000 mcg   Take 1 tablet (1,000 mcg) by mouth daily   Quantity:  90 tablet   Refills:  3        hydrochlorothiazide 25 MG tablet   Commonly known as:  HYDRODIURIL   This may have changed:  See the new instructions.   Used for:  HTN, goal below 140/90   Changed by:  Irene Shultz NP        Dose:  25 mg   Take 1 tablet (25 mg) by mouth daily   Quantity:  90 tablet   Refills:  1       levETIRAcetam 1000 MG Tabs   This may have changed:  See the new instructions.   Used for:  Seizure disorder (H)   Changed by:  Irene Shultz NP        Dose:  1 tablet   Take 1 tablet by mouth 2 times daily   Quantity:  180 tablet   Refills:  1       sertraline 100 MG tablet   Commonly known as:  ZOLOFT   This may have changed:  See the new instructions.   Used for:  Major depressive disorder, recurrent episode, moderate (H)   Changed by:  Irene Shultz NP        Dose:  200 mg   Take 2 tablets (200 mg) by mouth daily   Quantity:  180 tablet   Refills:  1            Where to get your medicines      These medications were sent to [id number] [place], [place] - [address]  [address] [address]    Hours:  Closed 10-16-08 business to [facility] Phone:  [phone]     carvedilol 25 MG tablet    folic acid 1 MG tablet    hydrochlorothiazide 25 MG tablet    levETIRAcetam 1000 MG Tabs    sertraline 100 MG tablet         Some of these will need a paper prescription and others can be bought over the counter.  Ask your nurse if you have questions.     Bring a paper prescription for each of these medications     diazepam 5 MG tablet                Primary Care Provider Office Phone # Fax #    Irene Shultz [phone]       [address] [place] [id number]        Goals        General    I want to start CD treatment program this year.  started 11-19-15 (pt-stated)     Notes - Note created  11/19/2015 12:55 PM by Michelle Blanca LSW    As of today's date 11/19/2015 goal is met at 26 - 50%.   Goal Status:  Active   Assessment completed at [facility]           Equal Access to Services     [facility] : Hadii michael allen gissellebianca Todd, waaxda luqadaha, qaybta kaalmaavery wilson, rosario keith. So [facility] [phone].    ATENCIÓN: Si habla español, tiene a rogel disposición servicios gratuitos de asistencia lingüística. Manjit al [phone].    We comply with applicable federal civil rights laws and [place] laws. We do not discriminate on the basis of race, color, national origin, age, disability, sex, sexual orientation, or gender identity.            Thank you!     Thank you for choosing [facility]  for your care. Our goal is always to provide you with excellent care. Hearing back from our patients is one way we can continue to improve our services. Please take a few minutes to complete the written survey that you may receive in the mail after your visit with us. Thank you!             Your Updated Medication List - Protect others around you: Learn how to safely use, store and throw away your medicines at www.disposemymeds.org.          This list is accurate as of: 11/28/17  6:11 PM.  Always use your most recent med list.                   Brand Name Dispense Instructions for use Diagnosis    amitriptyline 100 MG tablet    ELAVIL    30 tablet    Take 1 tablet (100 mg) by mouth At Bedtime    Myofascial pain, Chronic pain syndrome       betamethasone valerate 0.1 % cream    VALISONE    15 g    Apply topically 2 times daily    Dermatitis       buprenorphine HCl-naloxone HCl 8-2 MG per film    SUBOXONE    90 Film    Place 1 Film under the tongue 3 times daily    Encounter for long-term (current) use of high-risk medication       carvedilol 25 MG tablet    COREG    180 tablet    TAKE 1 TABLET TWICE A DAY WITH MEALS    HTN, goal below 140/90       cyclobenzaprine 10 MG tablet    FLEXERIL    90 tablet    Take 1 tablet (10 mg) by mouth 3 times daily as needed for muscle spasms    Myofascial pain       diazepam 5 MG tablet    VALIUM    60  tablet    Take 1 tablet (5 mg) by mouth 2 times daily as needed    Chronic bilateral low back pain without sciatica       folic acid 1 MG tablet    FOLVITE    90 tablet    Take 1 tablet (1,000 mcg) by mouth daily    Alcohol abuse       hydrochlorothiazide 25 MG tablet    HYDRODIURIL    90 tablet    Take 1 tablet (25 mg) by mouth daily    HTN, goal below 140/90       levETIRAcetam 1000 MG Tabs     180 tablet    Take 1 tablet by mouth 2 times daily    Seizure disorder (H)       sertraline 100 MG tablet    ZOLOFT    180 tablet    Take 2 tablets (200 mg) by mouth daily    Major depressive disorder, recurrent episode, moderate (H)       triamcinolone 0.1 % cream    KENALOG    30 g    Apply sparingly to affected area three times daily for 14 days.    Contact dermatitis due to poison ivy

## 2017-11-28 NOTE — PROGRESS NOTES
SUBJECTIVE:   Torey Coreas is a 47 year old male who presents to clinic today for the following health issues:       Hypertension Follow-up      Outpatient blood pressures are being checked at home.  Results are within normal limits.    Low Salt Diet: no added salt    BP Readings from Last 6 Encounters:   11/28/17 130/86   11/27/17 128/86   10/02/17 120/80   08/25/17 130/82   06/16/17 130/90   05/24/17 129/88         Anxiety Follow-Up    Status since last visit: No change    Other associated symptoms:None    Complicating factors:   Significant life event: Chronic Pain  Current substance abuse: None  Depression symptoms: No  SHAD-7 SCORE 1/29/2016 10/7/2016 7/16/2017   Total Score - - -   Total Score - - 0 (minimal anxiety)   Total Score 0 2 0     PHQ-9 SCORE 10/7/2016 7/16/2017 11/28/2017   Total Score - - -   Total Score MyChart - 0 -   Total Score 4 0 3         GAD7      In treatment for alcoholism through [facility] 1 1/2 month   Goes to group therapy 2 x a week   Individual therapy once a week     Last drink was August 11    Requesting valium to be refilled    Initially did very well on suboxone   Reports that it is no longer working to control his pain   He has been seeing Mikayla Nieves pain and addiction specialist   He tells me that he is not sure that he will resume care  He is consider seeing a specialist at a different pain clinic                 Amount of exercise or physical activity: None    Problems taking medications regularly: No    Medication side effects: none  Diet: regular (no restrictions)      Problem list and histories reviewed & adjusted, as indicated.  Additional history: as documented    Patient Active Problem List   Diagnosis     Seizure (H)     Hypertensive emergency     Generalized convulsive epilepsy (H)     Chronic low back pain     HTN, goal below 140/90     PRES (posterior reversible encephalopathy syndrome)     CARDIOVASCULAR SCREENING; LDL GOAL LESS THAN 160     Major  depression     Alcohol abuse     Health Care Home     Posterior reversible encephalopathy syndrome     Generalized anxiety disorder     Chronic pain syndrome     Encounter for long-term (current) use of high-risk medication     Chronic midline low back pain, with sciatica presence unspecified     Prediabetes     Alcohol use disorder, severe, dependence (H)     Past Surgical History:   Procedure Laterality Date     ABDOMEN SURGERY  6/8/15    spinal fusion     C REPAIR CRUCIATE LIGAMENT,KNEE Right 2010     FUSION LUMBAR ANTERIOR, FUSION LUMBAR POSTERIOR TWO LEVELS, COMBINED  6/8/2015    Segmental Solera pedicle screw instrumentation L4-S1; posteriolateral arthrodesis L4-L5, L5-S1; laminoforaminotomies for purposes of spinal stenosis decompression, bilateral L4-L5 and bilateral L5-S1; left posterior ilium bone marrow aspirate     GENITOURINARY SURGERY  2010    vasectomy     HERNIORRHAPHY INGUINAL Right 2008       Social History   Substance Use Topics     Smoking status: Former Smoker     Packs/day: 0.50     Years: 5.00     Types: Dip, chew, snus or snuff     Start date: 1/1/1989     Quit date: 2/18/2008     Smokeless tobacco: Current User      Comment: non smoker     Alcohol use No      Comment: h/o ETOH abuse      Family History   Problem Relation Age of Onset     Back Pain Mother      Depression Mother      none     Back Pain Father      DIABETES Father      none     Substance Abuse Father      alcoholic             Reviewed and updated as needed this visit by clinical staffTobacco  Allergies  Med Hx  Surg Hx  Fam Hx  Soc Hx      Reviewed and updated as needed this visit by Provider         ROS:  Constitutional, HEENT, cardiovascular, pulmonary, gi and gu systems are negative, except as otherwise noted.      OBJECTIVE:                                                    /86  Pulse 80  Temp 97  F (36.1  C) (Tympanic)  Resp 16  Wt 230 lb (104.3 kg)  BMI 31.19 kg/m2  Body mass index is 31.19  kg/(m^2).  GENERAL APPEARANCE: healthy, alert and no distress  RESP: lungs clear to auscultation - no rales, rhonchi or wheezes  CV: regular rates and rhythm, normal S1 S2, no S3 or S4 and no murmur, click or rub  ABDOMEN: soft, nontender, without hepatosplenomegaly or masses and bowel sounds normal  MS: extremities normal- no gross deformities noted  SKIN: no suspicious lesions or rashes  PSYCH: mentation appears normal and affect normal/bright    Diagnostic test results:  Diagnostic Test Results:  none        ASSESSMENT/PLAN:                                                    1. Seizure disorder (H)  Stable   Refilled   - levETIRAcetam 1000 MG TABS; Take 1 tablet by mouth 2 times daily  Dispense: 180 tablet; Refill: 1    2. Chronic bilateral low back pain without sciatica  Refilled today   Recommend that he follow up with pain clinic   - diazepam (VALIUM) 5 MG tablet; Take 1 tablet (5 mg) by mouth 2 times daily as needed  Dispense: 60 tablet; Refill: 5    3. HTN, goal below 140/90  Stable   Within goal   Refilled   - hydrochlorothiazide (HYDRODIURIL) 25 MG tablet; Take 1 tablet (25 mg) by mouth daily  Dispense: 90 tablet; Refill: 1  - carvedilol (COREG) 25 MG tablet; TAKE 1 TABLET TWICE A DAY WITH MEALS  Dispense: 180 tablet; Refill: 1    4. Major depressive disorder, recurrent episode, moderate (H)  Stable refilled   - sertraline (ZOLOFT) 100 MG tablet; Take 2 tablets (200 mg) by mouth daily  Dispense: 180 tablet; Refill: 1    5. Alcohol abuse  Recommend ongoing treatment   - folic acid (FOLVITE) 1 MG tablet; Take 1 tablet (1,000 mcg) by mouth daily  Dispense: 90 tablet; Refill: 3    6. Need for prophylactic vaccination and inoculation against influenza  - FLU VAC, SPLIT VIRUS IM > 3 YO (QUADRIVALENT) [03164]  - Vaccine Administration, Initial [52686]      Patient Instructions   Refilled medications   Stay in treatment as you doing     Follow up with shoulder specialist after MRI and pain specialist for further  management           Irene Shultz NP  [facility]

## 2017-11-29 ASSESSMENT — ANXIETY QUESTIONNAIRES: GAD7 TOTAL SCORE: 2

## 2017-11-29 NOTE — PATIENT INSTRUCTIONS
Refilled medications   Stay in treatment as you doing     Follow up with shoulder specialist after MRI and pain specialist for further management

## 2017-11-29 NOTE — PROGRESS NOTES

## 2017-12-07 ENCOUNTER — TELEPHONE (OUTPATIENT)
Dept: PALLIATIVE MEDICINE | Facility: CLINIC | Age: 47
End: 2017-12-07

## 2017-12-07 ENCOUNTER — TRANSFERRED RECORDS (OUTPATIENT)
Dept: HEALTH INFORMATION MANAGEMENT | Facility: CLINIC | Age: 47
End: 2017-12-07

## 2017-12-07 NOTE — TELEPHONE ENCOUNTER
MRI order faxed to [facility].     Romy Stewart, [facility]

## 2017-12-07 NOTE — TELEPHONE ENCOUNTER
Received call from Silvia at [facility] who stated that patient was going there for a second opinion. She stated that the patient would like to get his shoulder MRI with them and is requesting the order for that be sent to them. (Fax - [phone]). She was also requesting past OV notes and directed her to Medical Records for that request. Please fax shoulder MRI. Any questions - Phone #[phone]    Yumiko Arzate    [facility]

## 2017-12-07 NOTE — TELEPHONE ENCOUNTER
Routing to MA pool. Please fax shoulder MRI to [facility] per below.     Ann Hood  BSN-RN Care Coordinator  [facility]

## 2018-01-24 ENCOUNTER — TELEPHONE (OUTPATIENT)
Dept: FAMILY MEDICINE | Facility: CLINIC | Age: 48
End: 2018-01-24

## 2018-01-26 NOTE — TELEPHONE ENCOUNTER
I spoke with Carly.  Recommend stopping the valium in light of the suboxone treatment.  I discussed with her that he would benefit from psych involvement.  Valium used for anxietya nbd chronic back pain.    Liana Shultz NP

## 2018-02-21 ENCOUNTER — TRANSFERRED RECORDS (OUTPATIENT)
Dept: HEALTH INFORMATION MANAGEMENT | Facility: CLINIC | Age: 48
End: 2018-02-21

## 2018-03-15 DIAGNOSIS — F33.1 MAJOR DEPRESSIVE DISORDER, RECURRENT EPISODE, MODERATE (H): ICD-10-CM

## 2018-03-15 RX ORDER — SERTRALINE HYDROCHLORIDE 100 MG/1
TABLET, FILM COATED ORAL
Qty: 180 TABLET | Refills: 1 | Status: SHIPPED | OUTPATIENT
Start: 2018-03-15 | End: 2018-09-11

## 2018-03-15 NOTE — TELEPHONE ENCOUNTER
Significance: Major     Warning: Plasma concentrations and pharmacologic effects of amitriptyline may be increased by sertraline.    Onset: Delayed    Document Level: Probable    Interacting Medications/Orders:  Sertraline  Oral or Non-Oral, Systemic Tricyclic Antidepressants  Oral or Non-Oral, Systemic   1. sertraline    Order: sertraline (ZOLOFT) 100 MG tablet [Pharmacy Med Name: SERTRALINE HCL TABS 100MG] Route: none  Start: 03/15/2018 End: none Frequency: 1. amitriptyline    Order ([id number]): amitriptyline (ELAVIL) 100 MG tablet Route: Oral  Start: 11/27/2017 End: none Frequency: AT BEDTIME     Management Code: Professional review suggested

## 2018-03-15 NOTE — TELEPHONE ENCOUNTER
Requested Prescriptions   Pending Prescriptions Disp Refills     sertraline (ZOLOFT) 100 MG tablet [Pharmacy Med Name: SERTRALINE HCL TABS 100MG] 180 tablet 1     Sig: TAKE 2 TABLETS DAILY    SSRIs Protocol Passed    3/15/2018 12:04 AM       Passed - Patient is age 18 or older        PHQ-9 SCORE 10/7/2016 7/16/2017 11/28/2017   Total Score - - -   Total Score MyChart - 0 -   Total Score 4 0 3     SHAD-7 SCORE 10/7/2016 7/16/2017 11/28/2017   Total Score - - -   Total Score - 0 (minimal anxiety) -   Total Score 2 0 2       Last Written Prescription Date:  11/28/17  Last Fill Quantity: 180,  # refills: 1   Last office visit: 11/28/2017 with prescribing provider:     Future Office Visit:

## 2018-03-20 ENCOUNTER — HOSPITAL ENCOUNTER (OUTPATIENT)
Dept: PHYSICAL THERAPY | Facility: CLINIC | Age: 48
Setting detail: THERAPIES SERIES
End: 2018-03-20
Attending: ANESTHESIOLOGY
Payer: COMMERCIAL

## 2018-03-20 PROCEDURE — 97161 PT EVAL LOW COMPLEX 20 MIN: CPT | Mod: GP

## 2018-03-20 PROCEDURE — 97110 THERAPEUTIC EXERCISES: CPT | Mod: GP

## 2018-03-20 PROCEDURE — 40000185 ZZHC STATISTIC PT OUTPT VISIT

## 2018-03-20 NOTE — PROGRESS NOTES
PT Lumbar Evaluation 03/20/18 1200   General Information   Type of Visit Initial OP Ortho PT Evaluation   Start of Care Date 03/20/18   Referring Physician Dr Aldridge   Patient/Family Goals Statement determine if an electrical stim implant is beneficial   Orders Evaluate and Treat   Date of Order 02/21/18   Insurance Type ([employer])   Medical Diagnosis LBP   Surgical/Medical history reviewed Yes  (HTN, Depression, Chem dep, smoking, R ACL reconsruction 2013)   Body Part(s)   Body Part(s) Lumbar Spine/SI   Presentation and Etiology   Pertinent history of current problem (include personal factors and/or comorbidities that impact the POC) Pt reports fell out of a helicopter 30 ft in [facility] in [place]. He had immediate LBP and a concussion. It resolved, but now it has gotten worse starting in 2008-9 to the point it was getting hard to work.  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 He had ant and post L4-5, L5-S1 fusion in 2015.  The fusion helped incr his ROM, but the pain was still intense.  He comes to PT for documentation reasons to get a spinal stimulator implanted.   Impairments A. Pain;D. Decreased ROM;E. Decreased flexibility;H. Impaired gait;M. Locking or catching   Functional Limitations perform activities of daily living;perform required work activities;perform desired leisure / sports activities   Symptom Location across the low back, R>L, sometimes goes down buttocks and post/lat legs to the ankles   How/Where did it occur With a fall   Onset date of current episode/exacerbation 11/20/17   Chronicity Chronic   Pain rating (0-10 point scale) Best (/10);Worst (/10)  (currently 4-5/10)   Best (/10) 4   Worst (/10) 8  (when doing too much or sleeping in wrong position)   Pain quality A. Sharp;B. Dull;C. Aching;D. Burning;E. Shooting;F. Stabbing   Frequency of pain/symptoms A. Constant   Pain/symptoms are: Worse in the morning   Pain/symptoms exacerbated by A. Sitting;B. Walking;C. Lifting;D. Carrying;G.  "Certain positions;H. Overhead reach;I. Bending;J. ADL;K. Home tasks;L. Work tasks   Pain/symptoms eased by C. Rest  (prescription meds)   Progression of symptoms since onset: Unchanged   Prior Level of Function   Functional Level Prior Comment independent   Current Level of Function   Current Community Support Family/friend caregiver   Patient role/employment history A. Employed;G. 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 Yes   Have you fallen and had an injury in the past year? Yes   Timed Up and Go score (seconds) 14   Is patient a fall risk? Yes   Fall screen comments fallen on ice; mm strain   System Outcome Measures   Outcome Measures Low Back Pain (see Oswestry and Teressa)   Lumbar Spine/SI Objective Findings   Posture stands with slight fwd flexion, R lat flex   Balance/Proprioception (Single Leg Stance) R 5\", L 12\"   Flexion ROM fingers to mid thighs  ++ R>L LBP   Extension ROM to neutral ++ R>L LBP   Right Side Bending ROM fingers 3\" above superior pole of patella ++ R LBP   Left Side Bending ROM fingers 5\" above superior pole of patella ++ L LBP   Pelvic Screen difficult to assess as pt did not have the ROM needed to perform the tests   Hip Flexion (L2) Strength 5/5   Knee Flexion Strength 5/5   Knee Extension (L3) Strength 5/5   Ankle Dorsiflexion (L4) Strength 5/5   Ankle Plantar Flexion (S1) Strength 5/5   SLR -   Palpation very tender R>L lower lumbar paraspinals   Gait/Locomotion amb with wide BEVERLY, short step length, decr push off   Lumbar ROM Comment B rot WFL with + LBP   Hip Screen WFL except mild incr LBP with L BELEN   Transversus Abdominus Strength (Sravan Leg Lowering-deg) plank hold 11 sec--limited by incr LBP   Hip Abduction Strength 5/5   Hip " Adduction Strength 5/5   Hip Extension Strength 5/5   Great Toe Extension (L5) Strength 5/5   Hamstring Flexibility tight at 45* SLR L, 50* SLR R   Piriformis Flexibility normal   Crossover SLR -   Spring Test incr pain with PA glides to L1-3   Planned Therapy Interventions   Planned Therapy Interventions manual therapy;ROM;strengthening;stretching   Clinical Impression   Criteria for Skilled Therapeutic Interventions Met yes, treatment indicated   PT Diagnosis LBP   Influenced by the following impairments pain, decreased ROM, weak core   Functional limitations due to impairments sitting, walking, lifting, carrying, bending, work and home tasks   Clinical Presentation Stable/Uncomplicated   Clinical Presentation Rationale chronic, not progressing   Clinical Decision Making (Complexity) Low complexity   Therapy Frequency 1 time/week   Predicted Duration of Therapy Intervention (days/wks) 6 weeks   Risk & Benefits of therapy have been explained Yes   Patient, Family & other staff in agreement with plan of care Yes   Education Assessment   Preferred Learning Style Listening;Demonstration;Pictures/video   Barriers to Learning No barriers   ORTHO GOALS   PT Ortho Eval Goals 1;2;3;4   Ortho Goal 1   Goal Identifier 1   Goal Description Pt will be able to sleep through the night without waking from pain.   Target Date 04/10/18   Ortho Goal 2   Goal Identifier 2   Goal Description Pt will be able to sit for 45 min with < 3/10 pain.   Target Date 04/24/18   Ortho Goal 3   Goal Identifier 3   Goal Description Pt will be able to walk 1/2 mile with < 3/10 pain.   Target Date 05/01/18   Ortho Goal 4   Goal Identifier 4   Goal Description Pt will be independent with HEP for optimal functional recovery.   Target Date 05/01/18   Total Evaluation Time   Total Evaluation Time 30     Mercedes Badillo PT

## 2018-05-04 ENCOUNTER — OFFICE VISIT (OUTPATIENT)
Dept: FAMILY MEDICINE | Facility: CLINIC | Age: 48
End: 2018-05-04
Payer: COMMERCIAL

## 2018-05-04 VITALS
BODY MASS INDEX: 33.72 KG/M2 | DIASTOLIC BLOOD PRESSURE: 86 MMHG | HEIGHT: 72 IN | WEIGHT: 249 LBS | TEMPERATURE: 97.8 F | RESPIRATION RATE: 18 BRPM | HEART RATE: 88 BPM | SYSTOLIC BLOOD PRESSURE: 118 MMHG

## 2018-05-04 DIAGNOSIS — G40.909 SEIZURE DISORDER (H): ICD-10-CM

## 2018-05-04 DIAGNOSIS — G89.4 CHRONIC PAIN SYNDROME: Primary | ICD-10-CM

## 2018-05-04 DIAGNOSIS — F10.10 ALCOHOL ABUSE: ICD-10-CM

## 2018-05-04 DIAGNOSIS — G89.29 CHRONIC BILATERAL LOW BACK PAIN WITHOUT SCIATICA: ICD-10-CM

## 2018-05-04 DIAGNOSIS — M54.50 CHRONIC BILATERAL LOW BACK PAIN WITHOUT SCIATICA: ICD-10-CM

## 2018-05-04 DIAGNOSIS — I10 HTN, GOAL BELOW 140/90: ICD-10-CM

## 2018-05-04 PROCEDURE — 80061 LIPID PANEL: CPT | Performed by: FAMILY MEDICINE

## 2018-05-04 PROCEDURE — 80048 BASIC METABOLIC PNL TOTAL CA: CPT | Performed by: FAMILY MEDICINE

## 2018-05-04 PROCEDURE — 99214 OFFICE O/P EST MOD 30 MIN: CPT | Performed by: FAMILY MEDICINE

## 2018-05-04 PROCEDURE — 36415 COLL VENOUS BLD VENIPUNCTURE: CPT | Performed by: FAMILY MEDICINE

## 2018-05-04 PROCEDURE — 80177 DRUG SCRN QUAN LEVETIRACETAM: CPT | Mod: 90 | Performed by: FAMILY MEDICINE

## 2018-05-04 PROCEDURE — 99000 SPECIMEN HANDLING OFFICE-LAB: CPT | Performed by: FAMILY MEDICINE

## 2018-05-04 RX ORDER — CARVEDILOL 25 MG/1
TABLET ORAL
Qty: 180 TABLET | Refills: 1 | Status: SHIPPED | OUTPATIENT
Start: 2018-05-04 | End: 2019-02-06

## 2018-05-04 RX ORDER — NORTRIPTYLINE HCL 25 MG
CAPSULE ORAL
COMMUNITY
Start: 2018-02-21

## 2018-05-04 RX ORDER — HYDROCHLOROTHIAZIDE 25 MG/1
25 TABLET ORAL DAILY
Qty: 90 TABLET | Refills: 1 | Status: SHIPPED | OUTPATIENT
Start: 2018-05-04

## 2018-05-04 RX ORDER — FOLIC ACID 1 MG/1
1000 TABLET ORAL DAILY
Qty: 90 TABLET | Refills: 3 | Status: SHIPPED | OUTPATIENT
Start: 2018-05-04

## 2018-05-04 RX ORDER — LEVETIRACETAM 1000 MG/1
1 TABLET ORAL 2 TIMES DAILY
Qty: 180 TABLET | Refills: 1 | Status: SHIPPED | OUTPATIENT
Start: 2018-05-04

## 2018-05-04 RX ORDER — DIAZEPAM 5 MG
5 TABLET ORAL 2 TIMES DAILY PRN
Qty: 60 TABLET | Refills: 5 | Status: SHIPPED | OUTPATIENT
Start: 2018-05-04

## 2018-05-04 RX ORDER — METHADONE HYDROCHLORIDE 5 MG/1
5 TABLET ORAL
COMMUNITY

## 2018-05-04 NOTE — LETTER
May 7, 2018      Torey Coreas  [address] [facility] [address]        Dear ,    We are writing to inform you of your test results.    Your test results fall within the expected range(s) or remain unchanged from previous results.  Please continue with current treatment plan.    Resulted Orders   Lipid panel reflex to direct LDL Fasting   Result Value Ref Range    Cholesterol 248 (H) <200 mg/dL      Comment:      Desirable:       <200 mg/dl    Triglycerides 129 <150 mg/dL      Comment:      Fasting specimen    HDL Cholesterol 43 >39 mg/dL    LDL Cholesterol Calculated 179 (H) <100 mg/dL      Comment:      Above desirable:  100-129 mg/dl  Borderline High:  130-159 mg/dL  High:             160-189 mg/dL  Very high:       >189 mg/dl      Non HDL Cholesterol 205 (H) <130 mg/dL      Comment:      Above Desirable:  130-159 mg/dl  Borderline high:  160-189 mg/dl  High:             190-219 mg/dl  Very high:       >219 mg/dl     Basic metabolic panel   Result Value Ref Range    Sodium 135 133 - 144 mmol/L    Potassium 3.7 3.4 - 5.3 mmol/L    Chloride 98 94 - 109 mmol/L    Carbon Dioxide 30 20 - 32 mmol/L    Anion Gap 7 3 - 14 mmol/L    Glucose 118 (H) 70 - 99 mg/dL      Comment:      Fasting specimen    Urea Nitrogen 14 7 - 30 mg/dL    Creatinine 0.83 0.66 - 1.25 mg/dL    GFR Estimate >90 >60 mL/min/1.7m2      Comment:      Non  GFR Calc    GFR Estimate If Black >90 >60 mL/min/1.7m2      Comment:       GFR Calc    Calcium 9.2 8.5 - 10.1 mg/dL       If you have any questions or concerns, please call the clinic at the number listed above.       Sincerely,        Prabhjot Ross MD

## 2018-05-04 NOTE — LETTER
May 7, 2018      Torey Coreas  [address] [facility] [address]        Dear ,    We are writing to inform you of your test results.      Keppra level came back normal       Resulted Orders   Lipid panel reflex to direct LDL Fasting   Result Value Ref Range    Cholesterol 248 (H) <200 mg/dL      Comment:      Desirable:       <200 mg/dl    Triglycerides 129 <150 mg/dL      Comment:      Fasting specimen    HDL Cholesterol 43 >39 mg/dL    LDL Cholesterol Calculated 179 (H) <100 mg/dL      Comment:      Above desirable:  100-129 mg/dl  Borderline High:  130-159 mg/dL  High:             160-189 mg/dL  Very high:       >189 mg/dl      Non HDL Cholesterol 205 (H) <130 mg/dL      Comment:      Above Desirable:  130-159 mg/dl  Borderline high:  160-189 mg/dl  High:             190-219 mg/dl  Very high:       >219 mg/dl     Basic metabolic panel   Result Value Ref Range    Sodium 135 133 - 144 mmol/L    Potassium 3.7 3.4 - 5.3 mmol/L    Chloride 98 94 - 109 mmol/L    Carbon Dioxide 30 20 - 32 mmol/L    Anion Gap 7 3 - 14 mmol/L    Glucose 118 (H) 70 - 99 mg/dL      Comment:      Fasting specimen    Urea Nitrogen 14 7 - 30 mg/dL    Creatinine 0.83 0.66 - 1.25 mg/dL    GFR Estimate >90 >60 mL/min/1.7m2      Comment:      Non  GFR Calc    GFR Estimate If Black >90 >60 mL/min/1.7m2      Comment:       GFR Calc    Calcium 9.2 8.5 - 10.1 mg/dL   Keppra (Levetiracetam) Level   Result Value Ref Range    Keppra (Levetiracetam) Level 33 12 - 46 ug/mL      Comment:      (Note)  INTERPRETIVE INFORMATION: Keppra (Levetiracetam)  Therapeutic Range:  12-46 ug/mL             Toxic:  Not well Established  Pharmacokinetics of levetiracetam are affected by renal   function. Adverse effects may include somnolence, weakness,   headache and vomiting.  Performed by [facility],  [address] [place] [id number] [phone]  [email or web address], Jerzy King MD, Lab. Director         If you  have any questions or concerns, please call the clinic at the number listed above.       Sincerely,        Prabhjot Ross MD

## 2018-05-04 NOTE — PROGRESS NOTES
SUBJECTIVE:   Torey Coreas is a 47 year old male who presents to clinic today for the following health issues:      Hypertension Follow-up      Outpatient blood pressures are being checked at home.  Results are Pretty normal. Bottom number can be a tad high.  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   Adverse effects:  No- [facility]   Adherance    Taking medication as directed?  Yes    Participating in other treatments: not applicable  Risk Factors:    Sleep:  Fair    Mood/anxiety:  controlled    Recent family or social stressors:  none noted    Other aggravating factors: none  PHQ-9 SCORE 10/7/2016 7/16/2017 11/28/2017   Total Score - - -   Total Score MyChart - 0 -   Total Score 4 0 3     SHAD-7 SCORE 10/7/2016 7/16/2017 11/28/2017   Total Score - - -   Total Score - 0 (minimal anxiety) -   Total Score 2 0 2     Encounter-Level CSA - 03/21/2016:          Controlled Substance Agreement - Scan on 3/28/2016  2:09 PM : CONTROLLED SUBSTANCE AGREEMENT 03/21/2016 (below)                  Problem list and histories reviewed & adjusted, as indicated.  Additional history: as documented    Patient Active Problem List   Diagnosis     Seizure (H)     Hypertensive emergency     Generalized convulsive epilepsy (H)     Chronic low back pain     HTN, goal below 140/90     PRES (posterior reversible encephalopathy syndrome)     CARDIOVASCULAR SCREENING; LDL GOAL LESS THAN 160     Major depression     Alcohol abuse     Health Care Home     Posterior reversible encephalopathy syndrome      Generalized anxiety disorder     Chronic pain syndrome     Encounter for long-term (current) use of high-risk medication     Chronic midline low back pain, with sciatica presence unspecified     Prediabetes     Alcohol use disorder, severe, dependence (H)     Past Surgical History:   Procedure Laterality Date     ABDOMEN SURGERY  6/8/15    spinal fusion     C REPAIR CRUCIATE LIGAMENT,KNEE Right 2010     FUSION LUMBAR ANTERIOR, FUSION LUMBAR POSTERIOR TWO LEVELS, COMBINED  6/8/2015    Segmental Solera pedicle screw instrumentation L4-S1; posteriolateral arthrodesis L4-L5, L5-S1; laminoforaminotomies for purposes of spinal stenosis decompression, bilateral L4-L5 and bilateral L5-S1; left posterior ilium bone marrow aspirate     GENITOURINARY SURGERY  2010    vasectomy     HERNIORRHAPHY INGUINAL Right 2008       Social History   Substance Use Topics     Smoking status: Former Smoker     Packs/day: 0.50     Years: 5.00     Types: Dip, chew, snus or snuff     Start date: 1/1/1989     Quit date: 2/18/2008     Smokeless tobacco: Current User      Comment: non smoker     Alcohol use No      Comment: h/o ETOH abuse      Family History   Problem Relation Age of Onset     Back Pain Mother      Depression Mother      none     Back Pain Father      DIABETES Father      none     Substance Abuse Father      alcoholic         Current Outpatient Prescriptions   Medication Sig Dispense Refill     betamethasone valerate (VALISONE) 0.1 % cream Apply topically 2 times daily 15 g 1     carvedilol (COREG) 25 MG tablet TAKE 1 TABLET TWICE A DAY WITH MEALS 180 tablet 1     cyclobenzaprine (FLEXERIL) 10 MG tablet Take 1 tablet (10 mg) by mouth 3 times daily as needed for muscle spasms 90 tablet 1     diazepam (VALIUM) 5 MG tablet Take 1 tablet (5 mg) by mouth 2 times daily as needed 60 tablet 5     folic acid (FOLVITE) 1 MG tablet Take 1 tablet (1,000 mcg) by mouth daily 90 tablet 3     hydrochlorothiazide (HYDRODIURIL) 25 MG tablet Take  1 tablet (25 mg) by mouth daily 90 tablet 1     levETIRAcetam 1000 MG TABS Take 1 tablet by mouth 2 times daily 180 tablet 1     methadone (DOLOPHINE) 5 MG tablet Take 5 mg by mouth 5 times daily       nortriptyline (PAMELOR) 25 MG capsule        sertraline (ZOLOFT) 100 MG tablet TAKE 2 TABLETS DAILY 180 tablet 1     triamcinolone (KENALOG) 0.1 % cream Apply sparingly to affected area three times daily for 14 days. 30 g 0     Allergies   Allergen Reactions     Lisinopril Swelling     Angioedema     Wellbutrin [Bupropion] Other (See Comments)     Seizure      Recent Labs   Lab Test  03/27/17   0930  05/24/16   0827  11/12/15   1047  11/02/15   0720   03/31/15   0818   03/21/15   0959   LDL  162*  100*   --    --    --   92   --    --    HDL  51  70   --    --    --   51   --    --    TRIG  190*  195*   --    --    --   115   --    --    ALT   --   47  33  32   < >  48   < >  192*   CR   --   0.80  0.76  0.87   < >  0.79   < >  0.60*   GFRESTIMATED   --   >90  Non  GFR Calc    >90  Non  GFR Calc    >90  Non  GFR Calc     < >  >90  Non  GFR Calc     < >  >90  Non  GFR Calc     GFRESTBLACK   --   >90   GFR Calc    >90   GFR Calc    >90   GFR Calc     < >  >90   GFR Calc     < >  >90   GFR Calc     POTASSIUM   --   4.6  4.1  3.8   < >  3.7   < >  3.7   TSH   --    --    --    --    --    --    --   2.77    < > = values in this interval not displayed.      BP Readings from Last 3 Encounters:   05/04/18 118/86   11/28/17 130/86   11/27/17 128/86    Wt Readings from Last 3 Encounters:   05/04/18 249 lb (112.9 kg)   11/28/17 230 lb (104.3 kg)   11/27/17 238 lb (108 kg)                  Labs reviewed in EPIC    Reviewed and updated as needed this visit by clinical staff       Reviewed and updated as needed this visit by Provider         ROS:  Constitutional, HEENT,  cardiovascular, pulmonary, GI, , musculoskeletal, neuro, skin, endocrine and psych systems are negative, except as otherwise noted.    OBJECTIVE:     /86 (Cuff Size: Adult Regular)  Pulse 88  Temp 97.8  F (36.6  C) (Tympanic)  Resp 18  Ht 6' (1.829 m)  Wt 249 lb (112.9 kg)  BMI 33.77 kg/m2  Body mass index is 33.77 kg/(m^2).  GENERAL: alert, no distress and obese  EYES: Eyes grossly normal to inspection, PERRL and conjunctivae and sclerae normal  HENT: ear canals and TM's normal, nose and mouth without ulcers or lesions  NECK: no adenopathy, no asymmetry, masses, or scars and thyroid normal to palpation  RESP: lungs clear to auscultation - no rales, rhonchi or wheezes  CV: regular rate and rhythm, normal S1 S2, no S3 or S4, no murmur, click or rub, no peripheral edema and peripheral pulses strong  ABDOMEN: soft, nontender, no hepatosplenomegaly, no masses and bowel sounds normal  MS: chronic low back pain, no skin discoloration or swelling noted, reflux is 2+, normal strength  NEURO: Normal strength and tone, mentation intact and speech normal  PSYCH: mentation appears normal, affect normal/bright      ASSESSMENT/PLAN:       1. HTN, goal below 140/90  -Blood pressure stable currently  -Continue hydrochlorothiazide and carvedilol  -Suggested balanced diet and regular walks  - carvedilol (COREG) 25 MG tablet; TAKE 1 TABLET TWICE A DAY WITH MEALS  Dispense: 180 tablet; Refill: 1  - hydrochlorothiazide (HYDRODIURIL) 25 MG tablet; Take 1 tablet (25 mg) by mouth daily  Dispense: 90 tablet; Refill: 1  - Lipid panel reflex to direct LDL Fasting  - Basic metabolic panel    2. Chronic bilateral low back pain without sciatica  -Diazepam refilled, following pain management clinic  - diazepam (VALIUM) 5 MG tablet; Take 1 tablet (5 mg) by mouth 2 times daily as needed  Dispense: 60 tablet; Refill: 5  Known to have seizure disorder related to    3. Seizure disorder (H)  -Known to have seizure disorder related to  alcohol abuse  -Patient will be following neurology at [place]  -Keppra refilled, will do levels as well  - levETIRAcetam 1000 MG TABS; Take 1 tablet by mouth 2 times daily  Dispense: 180 tablet; Refill: 1  - Keppra (Levetiracetam) Level      4. Alcohol abuse  -Been sober for about 9 months now  -Continue folic acid  - folic acid (FOLVITE) 1 MG tablet; Take 1 tablet (1,000 mcg) by mouth daily  Dispense: 90 tablet; Refill: 3      5. Chronic pain syndrome  -Following pain management, on methadone treatment      Patient Instructions     Back Care Tips    Caring for your back  These are things you can do to prevent a recurrence of acute back pain and to reduce symptoms from chronic back pain:    Maintain a healthy weight. If you are overweight, losing weight will help most types of back pain.    Exercise is an important part of recovery from most types of back pain. The muscles behind and in front of the spine support the back. This means strengthening both the back muscles and the abdominal muscles will provide better support for your spine.     Swimming and brisk walking are good overall exercises to improve your fitness level.    Practice safe lifting methods (below).    Practice good posture when sitting, standing and walking. Avoid prolonged sitting. This puts more stress on the lower back than standing or walking.    Wear quality shoes with sufficient arch support. Foot and ankle alignment can affect back symptoms. Women should avoid wearing high heels.    Therapeutic massage can help relax the back muscles without stretching them.    During the first 24 to 72 hours after an acute injury or flare-up of chronic back pain, apply an ice pack to the painful area for 20 minutes and then remove it for 20 minutes, over a period of 60 to 90 minutes, or several times a day. As a safety precaution, do not use a heating pad at bedtime. Sleeping on a heating pad can lead to skin burns or tissue damage.    You can alternate  ice and heat therapies.  Medicines  Talk to your healthcare provider before using medicines, especially if you have other medical problems or are taking other medicines.    You may use acetaminophen or ibuprofen to control pain, unless your healthcare provider prescribed other pain medicine. If you have chronic conditions like diabetes, liver or kidney disease, stomach ulcers, or gastrointestinal bleeding, or are taking blood thinners, talk with your healthcare provider before taking any medicines.    Be careful if you are given prescription pain medicines, narcotics, or medicine for muscle spasm. They can cause drowsiness, affect your coordination, reflexes, and judgment. Do not drive or operate heavy machinery while taking these types of medicines. Take prescription pain medicine only as prescribed by your healthcare provider.  Lumbar stretch  Here is a simple stretching exercise that will help relax muscle spasm and keep your back more limber. If exercise makes your back pain worse, don t do it.    Lie on your back with your knees bent and both feet on the ground.    Slowly raise your left knee to your chest as you flatten your lower back against the floor. Hold for 5 seconds.    Relax and repeat the exercise with your right knee.    Do 10 of these exercises for each leg.  Safe lifting method    Don t bend over at the waist to lift an object off the floor.  Instead, bend your knees and hips in a squat.     Keep your back and head upright    Hold the object close to your body, directly in front of you.    Straighten your legs to lift the object.     Lower the object to the floor in the reverse fashion.    If you must slide something across the floor, push it.  Posture tips  Sitting  Sit in chairs with straight backs or low-back support. Keep your knees lower than your hips, with your feet flat on the floor.  When driving, sit up straight. Adjust the seat forward so you are not leaning toward the steering wheel.  A  small pillow or rolled towel behind your lower back may help if you are driving long distances.   Standing  When standing for long periods, shift most of your weight to one leg at a time. Alternate legs every few minutes.   Sleeping  The best way to sleep is on your side with your knees bent. Put a low pillow under your head to support your neck in a neutral spine position. Avoid thick pillows that bend your neck to one side. Put a pillow between your legs to further relax your lower back. If you sleep on your back, put pillows under your knees to support your legs in a slightly flexed position. Use a firm mattress. If your mattress sags, replace it, or use a 1/2-inch plywood board under the mattress to add support.  Follow-up care  Follow up with your healthcare provider, or as advised.  If X-rays, a CT scan or an MRI scan were taken, they will be reviewed by a radiologist. You will be notified of any new findings that may affect your care.  Call 911  Call 911 if any of the following occur:    Trouble breathing    Confusion    Very drowsy    Fainting or loss of consciousness    Rapid or very slow heart rate    Loss of  bowel or bladder control  When to seek medical advice  Call your healthcare provider right away if any of the following occur:    Pain becomes worse or spreads to your arms or legs    Weakness or numbness in one or both arms or legs    Numbness in the groin area  Date Last Reviewed: 6/1/2016 2000-2017 [employer]. [address] [place] [address]. All rights reserved. This information is not intended as a substitute for professional medical care. Always follow your healthcare professional's instructions.            Prabhjot Ross MD  [employer]

## 2018-05-04 NOTE — LETTER
May 7, 2018      Torey Coreas  [id number] [facility] [address]        Dear ,    We are writing to inform you of your test results.    Cholesterol levels are elevated, LDL (bad cholesterol) 179.  Would recommend for regular exercise, balanced diet and weight loss.  Electrolytes came back unremarkable.  Continue current medications. Let us know if there are any questions.     Resulted Orders   Lipid panel reflex to direct LDL Fasting   Result Value Ref Range    Cholesterol 248 (H) <200 mg/dL      Comment:      Desirable:       <200 mg/dl    Triglycerides 129 <150 mg/dL      Comment:      Fasting specimen    HDL Cholesterol 43 >39 mg/dL    LDL Cholesterol Calculated 179 (H) <100 mg/dL      Comment:      Above desirable:  100-129 mg/dl  Borderline High:  130-159 mg/dL  High:             160-189 mg/dL  Very high:       >189 mg/dl      Non HDL Cholesterol 205 (H) <130 mg/dL      Comment:      Above Desirable:  130-159 mg/dl  Borderline high:  160-189 mg/dl  High:             190-219 mg/dl  Very high:       >219 mg/dl     Basic metabolic panel   Result Value Ref Range    Sodium 135 133 - 144 mmol/L    Potassium 3.7 3.4 - 5.3 mmol/L    Chloride 98 94 - 109 mmol/L    Carbon Dioxide 30 20 - 32 mmol/L    Anion Gap 7 3 - 14 mmol/L    Glucose 118 (H) 70 - 99 mg/dL      Comment:      Fasting specimen    Urea Nitrogen 14 7 - 30 mg/dL    Creatinine 0.83 0.66 - 1.25 mg/dL    GFR Estimate >90 >60 mL/min/1.7m2      Comment:      Non  GFR Calc    GFR Estimate If Black >90 >60 mL/min/1.7m2      Comment:       GFR Calc    Calcium 9.2 8.5 - 10.1 mg/dL       If you have any questions or concerns, please call the clinic at the number listed above.       Sincerely,        Prabhjot Ross MD

## 2018-05-04 NOTE — MR AVS SNAPSHOT
After Visit Summary   5/4/2018    Torey Coreas    MRN: [id number]           Patient Information     Date Of Birth          1970        Visit Information        Provider Department      5/4/2018 9:40 AM Prabhjot Ross MD [facility]        Today's Diagnoses     Chronic pain syndrome    -  1    HTN, goal below 140/90        Chronic bilateral low back pain without sciatica        Seizure disorder (H)        Alcohol abuse          Care Instructions      Back Care Tips    Caring for your back  These are things you can do to prevent a recurrence of acute back pain and to reduce symptoms from chronic back pain:    Maintain a healthy weight. If you are overweight, losing weight will help most types of back pain.    Exercise is an important part of recovery from most types of back pain. The muscles behind and in front of the spine support the back. This means strengthening both the back muscles and the abdominal muscles will provide better support for your spine.     Swimming and brisk walking are good overall exercises to improve your fitness level.    Practice safe lifting methods (below).    Practice good posture when sitting, standing and walking. Avoid prolonged sitting. This puts more stress on the lower back than standing or walking.    Wear quality shoes with sufficient arch support. Foot and ankle alignment can affect back symptoms. Women should avoid wearing high heels.    Therapeutic massage can help relax the back muscles without stretching them.    During the first 24 to 72 hours after an acute injury or flare-up of chronic back pain, apply an ice pack to the painful area for 20 minutes and then remove it for 20 minutes, over a period of 60 to 90 minutes, or several times a day. As a safety precaution, do not use a heating pad at bedtime. Sleeping on a heating pad can lead to skin burns or tissue damage.    You can alternate ice and heat therapies.  Medicines  Talk to  your healthcare provider before using medicines, especially if you have other medical problems or are taking other medicines.    You may use acetaminophen or ibuprofen to control pain, unless your healthcare provider prescribed other pain medicine. If you have chronic conditions like diabetes, liver or kidney disease, stomach ulcers, or gastrointestinal bleeding, or are taking blood thinners, talk with your healthcare provider before taking any medicines.    Be careful if you are given prescription pain medicines, narcotics, or medicine for muscle spasm. They can cause drowsiness, affect your coordination, reflexes, and judgment. Do not drive or operate heavy machinery while taking these types of medicines. Take prescription pain medicine only as prescribed by your healthcare provider.  Lumbar stretch  Here is a simple stretching exercise that will help relax muscle spasm and keep your back more limber. If exercise makes your back pain worse, don t do it.    Lie on your back with your knees bent and both feet on the ground.    Slowly raise your left knee to your chest as you flatten your lower back against the floor. Hold for 5 seconds.    Relax and repeat the exercise with your right knee.    Do 10 of these exercises for each leg.  Safe lifting method    Don t bend over at the waist to lift an object off the floor.  Instead, bend your knees and hips in a squat.     Keep your back and head upright    Hold the object close to your body, directly in front of you.    Straighten your legs to lift the object.     Lower the object to the floor in the reverse fashion.    If you must slide something across the floor, push it.  Posture tips  Sitting  Sit in chairs with straight backs or low-back support. Keep your knees lower than your hips, with your feet flat on the floor.  When driving, sit up straight. Adjust the seat forward so you are not leaning toward the steering wheel.  A small pillow or rolled towel behind your  lower back may help if you are driving long distances.   Standing  When standing for long periods, shift most of your weight to one leg at a time. Alternate legs every few minutes.   Sleeping  The best way to sleep is on your side with your knees bent. Put a low pillow under your head to support your neck in a neutral spine position. Avoid thick pillows that bend your neck to one side. Put a pillow between your legs to further relax your lower back. If you sleep on your back, put pillows under your knees to support your legs in a slightly flexed position. Use a firm mattress. If your mattress sags, replace it, or use a 1/2-inch plywood board under the mattress to add support.  Follow-up care  Follow up with your healthcare provider, or as advised.  If X-rays, a CT scan or an MRI scan were taken, they will be reviewed by a radiologist. You will be notified of any new findings that may affect your care.  Call 911  Call 911 if any of the following occur:    Trouble breathing    Confusion    Very drowsy    Fainting or loss of consciousness    Rapid or very slow heart rate    Loss of  bowel or bladder control  When to seek medical advice  Call your healthcare provider right away if any of the following occur:    Pain becomes worse or spreads to your arms or legs    Weakness or numbness in one or both arms or legs    Numbness in the groin area  Date Last Reviewed: 6/1/2016 2000-2017 [employer]. [address] [place] [address]. All rights reserved. This information is not intended as a substitute for professional medical care. Always follow your healthcare professional's instructions.                Follow-ups after your visit        Who to contact     If you have questions or need follow up information about today's clinic visit or your schedule please contact [facility] directly at [phone].  Normal or non-critical lab and imaging results will be communicated to you by  MyChart, letter or phone within 4 business days after the clinic has received the results. If you do not hear from us within 7 days, please contact the clinic through "[employer]" or phone. If you have a critical or abnormal lab result, we will notify you by phone as soon as possible.  Submit refill requests through "[employer]" or call your pharmacy and they will forward the refill request to us. Please allow 3 business days for your refill to be completed.          Additional Information About Your Visit        "[employer] Information     "[employer] gives you secure access to your electronic health record. If you see a primary care provider, you can also send messages to your care team and make appointments. If you have questions, please call your primary care clinic.  If you do not have a primary care provider, please call [phone] and they will assist you.        Care EveryWhere ID     This is your Care EveryWhere ID. This could be used by other organizations to access your [facility] medical records  [id number]        Your Vitals Were     Pulse Temperature Respirations Height BMI (Body Mass Index)       88 97.8  F (36.6  C) (Tympanic) 18 6' (1.829 m) 33.77 kg/m2        Blood Pressure from Last 3 Encounters:   05/04/18 118/86   11/28/17 130/86   11/27/17 128/86    Weight from Last 3 Encounters:   05/04/18 249 lb (112.9 kg)   11/28/17 230 lb (104.3 kg)   11/27/17 238 lb (108 kg)              We Performed the Following     Basic metabolic panel     Keppra (Levetiracetam) Level     Lipid panel reflex to direct LDL Fasting          Today's Medication Changes          These changes are accurate as of 5/4/18 10:21 AM.  If you have any questions, ask your nurse or doctor.               Stop taking these medicines if you haven't already. Please contact your care team if you have questions.     amitriptyline 100 MG tablet   Commonly known as:  ELAVIL   Stopped by:  Prabhjot Ross MD           buprenorphine HCl-naloxone HCl 8-2 MG  per film   Commonly known as:  SUBOXONE   Stopped by:  Prabhjot Ross MD                Where to get your medicines      These medications were sent to [facility] - [place], [place] - [address]  [address] [address]     Phone:  [phone]     carvedilol 25 MG tablet    folic acid 1 MG tablet    hydrochlorothiazide 25 MG tablet    levETIRAcetam 1000 MG Tabs         Some of these will need a paper prescription and others can be bought over the counter.  Ask your nurse if you have questions.     Bring a paper prescription for each of these medications     diazepam 5 MG tablet               Information about OPIOIDS     PRESCRIPTION OPIOIDS: WHAT YOU NEED TO KNOW   You have a prescription for an opioid (narcotic) pain medicine. Opioids can cause addiction. If you have a history of chemical dependency of any type, you are at a higher risk of becoming addicted to opioids. Only take this medicine after all other options have been tried. Take it for as short a time and as few doses as possible.     Do not:    Drive. If you drive while taking these medicines, you could be arrested for driving under the influence (DUI).    Operate heavy machinery    Do any other dangerous activities while taking these medicines.     Drink any alcohol while taking these medicines.      Take with any other medicines that contain acetaminophen. Read all labels carefully. Look for the word  acetaminophen  or  Tylenol.  Ask your pharmacist if you have questions or are unsure.    Store your pills in a secure place, locked if possible. We will not replace any lost or stolen medicine. If you don t finish your medicine, please throw away (dispose) as directed by your pharmacist. The [place] [employer]ution [employer] has more information about safe disposal: https://www.pca.state.[place]/living-green/managing-unwanted-medications    All opioids tend to cause constipation. Drink plenty of water and  eat foods that have a lot of fiber, such as fruits, vegetables, prune juice, apple juice and high-fiber cereal. Take a laxative (Miralax, milk of magnesia, Colace, Senna) if you don t move your bowels at least every other day.          Primary Care Provider Office Phone # Fax #    Irene Shultz [phone]       [address]  \[facility] [place] [facility]\"" [address]        Goals        General    I want to start CD treatment program this year.  started 11-19-15 (pt-stated)     Notes - Note created  11/19/2015 12:55 PM by Michelle Blanca LSW    As of today's date 11/19/2015 goal is met at 26 - 50%.   Goal Status:  Active   [facility] : Arti renner Somoise, waaxda luqadaha, qaybta kaalmada ademonicayaavery, rosario wilcox . So [facility] [phone].    ATENCIÓN: Si habla español, tiene a rogel disposición servicios gratuitos de asistencia lingüística. Llame al [phone].    We comply with applicable federal civil rights laws and [place] laws. We do not discriminate on the basis of race, color, national origin, age, disability, sex, sexual orientation, or gender identity.            Thank you!     Thank you for choosing [facility]  for your care. Our goal is always to provide you with excellent care. Hearing back from our patients is one way we can continue to improve our services. Please take a few minutes to complete the written survey that you may receive in the mail after your visit with us. Thank you!             Your Updated Medication List - Protect others around you: Learn how to safely use, store and throw away your medicines at www.disposemymeds.org.          This list is accurate as of 5/4/18 10:21 AM.  Always use your most recent med list.                   Brand Name Dispense Instructions for use Diagnosis    betamethasone valerate 0.1 % cream    VALISONE    15 g    Apply topically  2 times daily    Dermatitis       carvedilol 25 MG tablet    COREG    180 tablet    TAKE 1 TABLET TWICE A DAY WITH MEALS    HTN, goal below 140/90       cyclobenzaprine 10 MG tablet    FLEXERIL    90 tablet    Take 1 tablet (10 mg) by mouth 3 times daily as needed for muscle spasms    Myofascial pain       diazepam 5 MG tablet    VALIUM    60 tablet    Take 1 tablet (5 mg) by mouth 2 times daily as needed    Chronic bilateral low back pain without sciatica       folic acid 1 MG tablet    FOLVITE    90 tablet    Take 1 tablet (1,000 mcg) by mouth daily    Alcohol abuse       hydrochlorothiazide 25 MG tablet    HYDRODIURIL    90 tablet    Take 1 tablet (25 mg) by mouth daily    HTN, goal below 140/90       levETIRAcetam 1000 MG Tabs     180 tablet    Take 1 tablet by mouth 2 times daily    Seizure disorder (H)       methadone 5 MG tablet    DOLOPHINE     Take 5 mg by mouth 5 times daily        nortriptyline 25 MG capsule    PAMELOR          sertraline 100 MG tablet    ZOLOFT    180 tablet    TAKE 2 TABLETS DAILY    Major depressive disorder, recurrent episode, moderate (H)       triamcinolone 0.1 % cream    KENALOG    30 g    Apply sparingly to affected area three times daily for 14 days.    Contact dermatitis due to poison ivy

## 2018-05-04 NOTE — NURSING NOTE
Chief Complaint   Patient presents with     Hypertension     Back Pain       Initial /86 (Cuff Size: Adult Regular)  Pulse 88  Temp 97.8  F (36.6  C) (Tympanic)  Resp 18  Ht 6' (1.829 m)  Wt 249 lb (112.9 kg)  BMI 33.77 kg/m2 Estimated body mass index is 33.77 kg/(m^2) as calculated from the following:    Height as of this encounter: 6' (1.829 m).    Weight as of this encounter: 249 lb (112.9 kg).      Health Maintenance that is potentially due pending provider review:  NONE    n/a    Is there anyone who you would like to be able to receive your results? Not Applicable  If yes have patient fill out LISA

## 2018-05-04 NOTE — PATIENT INSTRUCTIONS
Back Care Tips    Caring for your back  These are things you can do to prevent a recurrence of acute back pain and to reduce symptoms from chronic back pain:    Maintain a healthy weight. If you are overweight, losing weight will help most types of back pain.    Exercise is an important part of recovery from most types of back pain. The muscles behind and in front of the spine support the back. This means strengthening both the back muscles and the abdominal muscles will provide better support for your spine.     Swimming and brisk walking are good overall exercises to improve your fitness level.    Practice safe lifting methods (below).    Practice good posture when sitting, standing and walking. Avoid prolonged sitting. This puts more stress on the lower back than standing or walking.    Wear quality shoes with sufficient arch support. Foot and ankle alignment can affect back symptoms. Women should avoid wearing high heels.    Therapeutic massage can help relax the back muscles without stretching them.    During the first 24 to 72 hours after an acute injury or flare-up of chronic back pain, apply an ice pack to the painful area for 20 minutes and then remove it for 20 minutes, over a period of 60 to 90 minutes, or several times a day. As a safety precaution, do not use a heating pad at bedtime. Sleeping on a heating pad can lead to skin burns or tissue damage.    You can alternate ice and heat therapies.  Medicines  Talk to your healthcare provider before using medicines, especially if you have other medical problems or are taking other medicines.    You may use acetaminophen or ibuprofen to control pain, unless your healthcare provider prescribed other pain medicine. If you have chronic conditions like diabetes, liver or kidney disease, stomach ulcers, or gastrointestinal bleeding, or are taking blood thinners, talk with your healthcare provider before taking any medicines.    Be careful if you are given  prescription pain medicines, narcotics, or medicine for muscle spasm. They can cause drowsiness, affect your coordination, reflexes, and judgment. Do not drive or operate heavy machinery while taking these types of medicines. Take prescription pain medicine only as prescribed by your healthcare provider.  Lumbar stretch  Here is a simple stretching exercise that will help relax muscle spasm and keep your back more limber. If exercise makes your back pain worse, don t do it.    Lie on your back with your knees bent and both feet on the ground.    Slowly raise your left knee to your chest as you flatten your lower back against the floor. Hold for 5 seconds.    Relax and repeat the exercise with your right knee.    Do 10 of these exercises for each leg.  Safe lifting method    Don t bend over at the waist to lift an object off the floor.  Instead, bend your knees and hips in a squat.     Keep your back and head upright    Hold the object close to your body, directly in front of you.    Straighten your legs to lift the object.     Lower the object to the floor in the reverse fashion.    If you must slide something across the floor, push it.  Posture tips  Sitting  Sit in chairs with straight backs or low-back support. Keep your knees lower than your hips, with your feet flat on the floor.  When driving, sit up straight. Adjust the seat forward so you are not leaning toward the steering wheel.  A small pillow or rolled towel behind your lower back may help if you are driving long distances.   Standing  When standing for long periods, shift most of your weight to one leg at a time. Alternate legs every few minutes.   Sleeping  The best way to sleep is on your side with your knees bent. Put a low pillow under your head to support your neck in a neutral spine position. Avoid thick pillows that bend your neck to one side. Put a pillow between your legs to further relax your lower back. If you sleep on your back, put pillows  under your knees to support your legs in a slightly flexed position. Use a firm mattress. If your mattress sags, replace it, or use a 1/2-inch plywood board under the mattress to add support.  Follow-up care  Follow up with your healthcare provider, or as advised.  If X-rays, a CT scan or an MRI scan were taken, they will be reviewed by a radiologist. You will be notified of any new findings that may affect your care.  Call 911  Call 911 if any of the following occur:    Trouble breathing    Confusion    Very drowsy    Fainting or loss of consciousness    Rapid or very slow heart rate    Loss of  bowel or bladder control  When to seek medical advice  Call your healthcare provider right away if any of the following occur:    Pain becomes worse or spreads to your arms or legs    Weakness or numbness in one or both arms or legs    Numbness in the groin area  Date Last Reviewed: 6/1/2016 2000-2017 [employer]. [address] [place] [address]. All rights reserved. This information is not intended as a substitute for professional medical care. Always follow your healthcare professional's instructions.

## 2018-05-05 LAB
ANION GAP SERPL CALCULATED.3IONS-SCNC: 7 MMOL/L (ref 3–14)
BUN SERPL-MCNC: 14 MG/DL (ref 7–30)
CALCIUM SERPL-MCNC: 9.2 MG/DL (ref 8.5–10.1)
CHLORIDE SERPL-SCNC: 98 MMOL/L (ref 94–109)
CHOLEST SERPL-MCNC: 248 MG/DL
CO2 SERPL-SCNC: 30 MMOL/L (ref 20–32)
CREAT SERPL-MCNC: 0.83 MG/DL (ref 0.66–1.25)
GFR SERPL CREATININE-BSD FRML MDRD: >90 ML/MIN/1.7M2
GLUCOSE SERPL-MCNC: 118 MG/DL (ref 70–99)
HDLC SERPL-MCNC: 43 MG/DL
LDLC SERPL CALC-MCNC: 179 MG/DL
LEVETIRACETAM SERPL-MCNC: 33 UG/ML (ref 12–46)
NONHDLC SERPL-MCNC: 205 MG/DL
POTASSIUM SERPL-SCNC: 3.7 MMOL/L (ref 3.4–5.3)
SODIUM SERPL-SCNC: 135 MMOL/L (ref 133–144)
TRIGL SERPL-MCNC: 129 MG/DL

## 2018-09-11 DIAGNOSIS — F33.1 MAJOR DEPRESSIVE DISORDER, RECURRENT EPISODE, MODERATE (H): ICD-10-CM

## 2018-09-11 RX ORDER — SERTRALINE HYDROCHLORIDE 100 MG/1
TABLET, FILM COATED ORAL
Qty: 180 TABLET | Refills: 0 | Status: SHIPPED | OUTPATIENT
Start: 2018-09-11

## 2018-09-18 ENCOUNTER — HOSPITAL ENCOUNTER (EMERGENCY)
Facility: CLINIC | Age: 48
Discharge: HOME | End: 2018-09-18
Attending: PHYSICIAN ASSISTANT | Admitting: PHYSICIAN ASSISTANT
Payer: COMMERCIAL

## 2018-09-18 VITALS
HEART RATE: 96 BPM | SYSTOLIC BLOOD PRESSURE: 148 MMHG | WEIGHT: 226 LBS | OXYGEN SATURATION: 100 % | RESPIRATION RATE: 16 BRPM | DIASTOLIC BLOOD PRESSURE: 101 MMHG | TEMPERATURE: 98.4 F | BODY MASS INDEX: 30.65 KG/M2

## 2018-09-18 DIAGNOSIS — S51.011A ELBOW LACERATION, RIGHT, INITIAL ENCOUNTER: Primary | ICD-10-CM

## 2018-09-18 PROCEDURE — 12001 RPR S/N/AX/GEN/TRNK 2.5CM/<: CPT | Mod: Z6 | Performed by: PHYSICIAN ASSISTANT

## 2018-09-18 PROCEDURE — G0463 HOSPITAL OUTPT CLINIC VISIT: HCPCS | Performed by: PHYSICIAN ASSISTANT

## 2018-09-18 PROCEDURE — 12001 RPR S/N/AX/GEN/TRNK 2.5CM/<: CPT | Performed by: PHYSICIAN ASSISTANT

## 2018-09-18 PROCEDURE — 99213 OFFICE O/P EST LOW 20 MIN: CPT | Mod: 25 | Performed by: PHYSICIAN ASSISTANT

## 2018-09-18 ASSESSMENT — ENCOUNTER SYMPTOMS
NEUROLOGICAL NEGATIVE: 1
CONSTITUTIONAL NEGATIVE: 1
MUSCULOSKELETAL NEGATIVE: 1
WOUND: 1

## 2018-09-18 NOTE — ED AVS SNAPSHOT
[facility] Emergency Department    [address] [address]    Phone:  [phone]    Fax:  [phone]                                       Torey Coreas   MRN: [id number]    Department:  [facility] Emergency Department   Date of Visit:  9/18/2018           After Visit Summary Signature Page     I have received my discharge instructions, and my questions have been answered. I have discussed any challenges I see with this plan with the nurse or doctor.    ..........................................................................................................................................  Patient/Patient Representative Signature      ..........................................................................................................................................  Patient Representative Print Name and Relationship to Patient    ..................................................               ................................................  Date                                   Time    ..........................................................................................................................................  Reviewed by Signature/Title    ...................................................              ..............................................  Date                                               Time          22EPIC Rev 08/18

## 2018-09-18 NOTE — ED AVS SNAPSHOT
[facility] Emergency Department    [address] [facility] [address]    Phone:  [phone]    Fax:  [phone]                                       Torey Coreas   MRN: [id number]    Department:  [facility] Emergency Department   Date of Visit:  9/18/2018           Patient Information     Date Of Birth          1970        Your diagnoses for this visit were:     Elbow laceration, right, initial encounter        You were seen by Bozena Carmichael PA-C.      Follow-up Information     Call Irene Shultz NP.    Specialty:  Nurse Practitioner - Adult Health    Why:  7-10 days for suture removal    Contact information:    [address] [facility] [address]  [phone]          Follow up with [facility] Emergency Department.    Specialty:  EMERGENCY MEDICINE    Why:  As needed, If symptoms worsen    Contact information:    [address] [place] [id number] [phone]    Additional information:    The medical center is located at   [address] [facility] [facility] (between [address] in [place], [place] miles north   of [place]).      Discharge References/Attachments     LACERATION, EXTREMITY: STITCHES, STAPLE, OR TAPE (ENGLISH)      24 Hour Appointment Hotline       To make an appointment at any [facility] clinic, call [phone] ([phone]). If you don't have a family doctor or clinic, we will help you find one. [facility] clinics are conveniently located to serve the needs of you and your family.             Review of your medicines      Our records show that you are taking the medicines listed below. If these are incorrect, please call your family doctor or clinic.        Dose / Directions Last dose taken    betamethasone valerate 0.1 % cream   Commonly known as:  VALISONE   Quantity:  15 g        Apply topically 2 times daily   Refills:  1        carvedilol 25 MG tablet   Commonly known as:  COREG   Quantity:  180 tablet        TAKE 1 TABLET TWICE A DAY  WITH MEALS   Refills:  1        cyclobenzaprine 10 MG tablet   Commonly known as:  FLEXERIL   Dose:  10 mg   Quantity:  90 tablet        Take 1 tablet (10 mg) by mouth 3 times daily as needed for muscle spasms   Refills:  1        diazepam 5 MG tablet   Commonly known as:  VALIUM   Dose:  5 mg   Quantity:  60 tablet        Take 1 tablet (5 mg) by mouth 2 times daily as needed   Refills:  5        folic acid 1 MG tablet   Commonly known as:  FOLVITE   Dose:  1000 mcg   Quantity:  90 tablet        Take 1 tablet (1,000 mcg) by mouth daily   Refills:  3        hydrochlorothiazide 25 MG tablet   Commonly known as:  HYDRODIURIL   Dose:  25 mg   Quantity:  90 tablet        Take 1 tablet (25 mg) by mouth daily   Refills:  1        levETIRAcetam 1000 MG Tabs   Dose:  1 tablet   Quantity:  180 tablet        Take 1 tablet by mouth 2 times daily   Refills:  1        methadone 5 MG tablet   Commonly known as:  DOLOPHINE   Dose:  5 mg        Take 5 mg by mouth 5 times daily   Refills:  0        nortriptyline 25 MG capsule   Commonly known as:  PAMELOR        Refills:  0        sertraline 100 MG tablet   Commonly known as:  ZOLOFT   Quantity:  180 tablet        TAKE 2 TABLETS DAILY   Refills:  0        triamcinolone 0.1 % cream   Commonly known as:  KENALOG   Quantity:  30 g        Apply sparingly to affected area three times daily for 14 days.   Refills:  0                Orders Needing Specimen Collection     None      Pending Results     No orders found from 9/16/2018 to 9/19/2018.            Pending Culture Results     No orders found from 9/16/2018 to 9/19/2018.            Pending Results Instructions     If you had any lab results that were not finalized at the time of your Discharge, you can call the ED Lab Result RN at [phone]. You will be contacted by this team for any positive Lab results or changes in treatment. The nurses are available 7 days a week from 10A to 6:30P.  You can leave a message 24 hours per day and  they will return your call.        Test Results From Your Hospital Stay               Thank you for choosing [facility]       Thank you for choosing [facility] for your care. Our goal is always to provide you with excellent care. Hearing back from our patients is one way we can continue to improve our services. Please take a few minutes to complete the written survey that you may receive in the mail after you visit with us. Thank you!        [employer] Information     [employer] gives you secure access to your electronic health record. If you see a primary care provider, you can also send messages to your care team and make appointments. If you have questions, please call your primary care clinic.  If you do not have a primary care provider, please call [phone] and they will assist you.        Care EveryWhere ID     This is your Care EveryWhere ID. This could be used by other organizations to access your [facility] medical records  [id number]        Equal Access to Services     MARGARET GARY : Arti Brooks, chely monae, rosario rojas. So [facility] [phone].    ATENCIÓN: Si habla español, tiene a rogel disposición servicios gratuitos de asistencia lingüística. Llame al [phone].    We comply with applicable federal civil rights laws and [place] laws. We do not discriminate on the basis of race, color, national origin, age, disability, sex, sexual orientation, or gender identity.            After Visit Summary       This is your record. Keep this with you and show to your community pharmacist(s) and doctor(s) at your next visit.

## 2018-09-18 NOTE — ED PROVIDER NOTES
History     Chief Complaint   Patient presents with     Laceration     arm cut by galvanized steel     HPI  Torey Coreas is a 48 year old male who presents with complaints of right elbow laceration today.  Patient states he accidentally cut himself against a piece of galvanized steel.  This occurred just prior to arrival.  Bleeding is controlled.  Patient is moving his elbow without difficulties.  Tetanus was in 2015.      Problem List:    Patient Active Problem List    Diagnosis Date Noted     Alcohol use disorder, severe, dependence (H) 11/27/2017     Priority: Medium     Prediabetes 03/27/2017     Priority: Medium     Encounter for long-term (current) use of high-risk medication 02/17/2017     Priority: Medium     Chronic midline low back pain, with sciatica presence unspecified 02/17/2017     Priority: Medium     Chronic pain syndrome 02/01/2016     Priority: Medium     Patient is followed by BANDAR GORDON for ongoing prescription of pain medication.  All refills should be approved by this provider, or covering partner.    Medication(s):  oxycodone.   Maximum quantity per month: 260  Clinic visit frequency required: Q 2 months     Controlled substance agreement on file: Yes       Date(s): 3/21/16    Pain Clinic evaluation in the past: Yes       Date/Location:  12/16/15 [facility]  Was seen by [facility] prior    DIRE Total Score(s):    3/22/2016   Total Score 14       Last [facility] website verification:  done on 3/21/16   [email or web address]         Generalized anxiety disorder 01/07/2016     Priority: Medium     Posterior reversible encephalopathy syndrome 10/22/2015     Priority: Medium     Health Care Home 03/27/2015     Priority: Medium     State Tier Level:    Status:  Closed 12-16-15  No services needed  Care Coordinator:  Michelle Blanca    See Letters for [facility] Care Plan             Alcohol abuse 03/21/2015     Priority: Medium     3/20/15-3/23/2015- Detox at the  [facility]     Started drinking again and  stopped 5/2016        CARDIOVASCULAR SCREENING; LDL GOAL LESS THAN 160 01/14/2015     Priority: Medium     Major depression 01/14/2015     Priority: Medium     Chronic low back pain 01/09/2015     Priority: Medium     HTN, goal below 140/90 01/09/2015     Priority: Medium     PRES (posterior reversible encephalopathy syndrome) 01/09/2015     Priority: Medium     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 Provider to review       Hypertensive emergency 11/10/2014     Priority: Medium     Seizure (H) 09/22/2014     Priority: Medium        Past Medical History:    Past Medical History:   Diagnosis Date     Alcohol abuse      Chronic low back pain      Depression      HTN (hypertension)      Partial epilepsy (H)      PRES (posterior reversible encephalopathy syndrome)        Past Surgical History:    Past Surgical History:   Procedure Laterality Date     ABDOMEN SURGERY  6/8/15    spinal fusion     C REPAIR CRUCIATE LIGAMENT,KNEE Right 2010     FUSION LUMBAR ANTERIOR, FUSION LUMBAR POSTERIOR TWO LEVELS, COMBINED  6/8/2015    Segmental Solera pedicle screw instrumentation L4-S1; posteriolateral arthrodesis L4-L5, L5-S1; laminoforaminotomies for purposes of spinal stenosis decompression, bilateral L4-L5 and bilateral L5-S1; left posterior ilium bone marrow aspirate     GENITOURINARY SURGERY  2010    vasectomy     HERNIORRHAPHY INGUINAL Right 2008       Family History:    Family History   Problem Relation Age of Onset     Back Pain Mother      Depression Mother      none     Back Pain Father      Diabetes Father      none     Substance Abuse Father      alcoholic       Social History:  Marital Status:   [2]  Social History   Substance Use Topics     Smoking status: Former Smoker     Packs/day: 0.50     Years: 5.00     Types: Dip, chew, snus or snuff     Start date: 1/1/1989     Quit  date: 2/18/2008     Smokeless tobacco: Current User      Comment: non smoker     Alcohol use No      Comment: h/o ETOH abuse         Medications:      betamethasone valerate (VALISONE) 0.1 % cream   carvedilol (COREG) 25 MG tablet   cyclobenzaprine (FLEXERIL) 10 MG tablet   diazepam (VALIUM) 5 MG tablet   folic acid (FOLVITE) 1 MG tablet   hydrochlorothiazide (HYDRODIURIL) 25 MG tablet   levETIRAcetam 1000 MG TABS   methadone (DOLOPHINE) 5 MG tablet   nortriptyline (PAMELOR) 25 MG capsule   sertraline (ZOLOFT) 100 MG tablet   triamcinolone (KENALOG) 0.1 % cream         Review of Systems   Constitutional: Negative.    Musculoskeletal: Negative.    Skin: Positive for wound.   Neurological: Negative.    All other systems reviewed and are negative.      Physical Exam   BP: (!) 148/101  Pulse: 96  Temp: 98.4  F (36.9  C)  Resp: 16  Weight: 102.5 kg (226 lb)  SpO2: 100 %      Physical Exam   Constitutional: He appears well-developed and well-nourished. No distress.   HENT:   Head: Normocephalic and atraumatic.   Eyes: Conjunctivae are normal.   Neck: Neck supple.   Cardiovascular: Intact distal pulses.    Pulmonary/Chest: Effort normal.   Musculoskeletal: Normal range of motion.        Right elbow: He exhibits laceration. He exhibits normal range of motion, no swelling, no effusion and no deformity. No tenderness found.        Right upper arm: Normal.        Right forearm: Normal.        Right hand: Normal.   2 cm linear laceration to right posterior elbow.  No tendon involvement.  Full active and passive ROM of elbow.  Full strength.  Sensation intact.   Neurological: He is alert. He has normal strength. No sensory deficit.   Skin: Skin is warm and dry.       ED Course     ED Course     Laceration repair  Date/Time: 9/18/2018 5:19 PM  Performed by: LISA BELL  Authorized by: LISA BELL   Consent: Verbal consent obtained.  Risks and benefits: risks, benefits and alternatives were discussed  Consent given by:  patient  Patient understanding: patient states understanding of the procedure being performed  Patient identity confirmed: verbally with patient  Body area: upper extremity  Location details: right elbow  Laceration length: 2 cm  Foreign bodies: no foreign bodies  Tendon involvement: none  Nerve involvement: none  Anesthesia: local infiltration    Anesthesia:  Local Anesthetic: lidocaine 1% without epinephrine  Preparation: Patient was prepped and draped in the usual sterile fashion.  Irrigation solution: saline  Irrigation method: syringe  Amount of cleaning: standard  Debridement: none  Degree of undermining: none  Skin closure: 4-0 nylon  Number of sutures: 3  Technique: simple  Approximation: close  Approximation difficulty: simple  Dressing: antibiotic ointment  Patient tolerance: Patient tolerated the procedure well with no immediate complications          No results found for this or any previous visit (from the past 24 hour(s)).    Medications - No data to display    Assessments & Plan (with Medical Decision Making)     Pt is a 48 year old male who presents with complaints of right elbow laceration today.  Patient states he accidentally cut himself against a piece of galvanized steel.  This occurred just prior to arrival.  Tetanus was in 2015.  Pt is afebrile on arrival.  Exam as above.  Laceration was cleaned and repaired (see above procedure note).  Return precautions were reviewed.  Hand-outs were provided.    Patient was instructed to follow-up with PCP in 7-10 days for suture removal and for continued care and management or sooner if new or worsening symptoms.  He is to return to the ED for persistent and/or worsening symptoms.  Patient expressed understanding of the diagnosis and plan and was discharged home in good condition.    I have reviewed the nursing notes.    I have reviewed the findings, diagnosis, plan and need for follow up with the patient.    Discharge Medication List as of 9/18/2018  5:16  PM          Final diagnoses:   Elbow laceration, right, initial encounter       9/18/2018   [facility] EMERGENCY DEPARTMENT     Bozena Carmichael PA-C  09/18/18 [phone]

## 2019-02-06 DIAGNOSIS — I10 HTN, GOAL BELOW 140/90: ICD-10-CM

## 2019-02-07 RX ORDER — CARVEDILOL 25 MG/1
TABLET ORAL
Qty: 180 TABLET | Refills: 0 | Status: SHIPPED | OUTPATIENT
Start: 2019-02-07

## 2019-02-07 NOTE — TELEPHONE ENCOUNTER
"Requested Prescriptions   Pending Prescriptions Disp Refills     carvedilol (COREG) 25 MG tablet [Pharmacy Med Name: CARVEDILOL (IR) TABS 25MG] 180 tablet 1     Sig: TAKE 1 TABLET TWICE A DAY WITH MEALS    Beta-Blockers Protocol Failed - 2/6/2019 11:40 PM       Failed - Blood pressure under 140/90 in past 12 months    BP Readings from Last 3 Encounters:   09/18/18 (!) 148/101   05/04/18 118/86   11/28/17 130/86                Passed - Patient is age 6 or older       Passed - Recent (12 mo) or future (30 days) visit within the authorizing provider's specialty    Patient had office visit in the last 12 months or has a visit in the next 30 days with authorizing provider or within the authorizing provider's specialty.  See \"Patient Info\" tab in inbasket, or \"Choose Columns\" in Meds & Orders section of the refill encounter.             Passed - Medication is active on med list        Last Written Prescription Date:  5/4/18  Last Fill Quantity: 180,  # refills: 1   Last office visit: 5/4/2018 with prescribing provider:      Future Office Visit:      "

## 2019-10-01 PROBLEM — M54.50 CHRONIC MIDLINE LOW BACK PAIN: Status: ACTIVE | Noted: 2017-02-17

## 2019-10-22 ENCOUNTER — ALLIED HEALTH/NURSE VISIT (OUTPATIENT)
Dept: FAMILY MEDICINE | Facility: CLINIC | Age: 49
End: 2019-10-22

## 2019-10-22 VITALS — HEART RATE: 104 BPM | DIASTOLIC BLOOD PRESSURE: 92 MMHG | SYSTOLIC BLOOD PRESSURE: 136 MMHG | OXYGEN SATURATION: 94 %

## 2019-10-22 DIAGNOSIS — I67.83 PRES (POSTERIOR REVERSIBLE ENCEPHALOPATHY SYNDROME): Primary | ICD-10-CM

## 2019-10-22 DIAGNOSIS — R56.9 SEIZURE (H): ICD-10-CM

## 2019-10-22 PROCEDURE — 99207 ZZC NO CHARGE NURSE ONLY: CPT
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
## 2019-10-22 NOTE — PATIENT INSTRUCTIONS
Patient Education     Understanding Seizures  What is a seizure?  A seizure is a sudden, brief change in the electrical activity in your brain. Seizures can be caused by injury to the brain, scar formation, tumors, stroke or infection. Often the cause is not known. With treatment, most people lead normal lives.   What is the treatment for seizures?  The main treatment for seizures are anti-epileptic drugs (AEDs). These drugs greatly reduce or prevent seizures in most people. Tips for taking your medicines:    Never stop taking your seizure medicine without talking to your doctor. Do not stop your drugs just because your seizures have stopped.    Do not skip a dose. This can change how well the drug works.    If you miss a dose, take it as soon as you remember. If it is within a few hours of your next dose, skip the dose you forgot, and take your next dose. After that, go back to the normal schedule.    If you miss more than one dose, talk to your doctor or nurse.    NEVER take double doses.    Get refills ahead of time before you run out.    Store your medicines in a dry place. Keep medicines in the bottle that they came in. Throw away all used bottles.    Keep a list of the medicines you take.    Don't take herbal supplements or antacids without talking to your doctor first. And ask your pharmacist about taking drugstore medicines.    If you need help remembering to take your medicines, use a pillbox.    Ask family, friends or coworkers to remind you when it is time to take a dose.  Your blood levels will need to be tested to be sure your getting the right dose. Your doctor will tell you when you should be tested.  If you have side effects or another seizure, your doctor may need to adjust the dose or change the medicines.   What are the side effects of AED medicines?  You may have:    Dizziness    Trouble thinking    Tiredness    Stomach upset    Weight gain or loss    Depression    Allergic reaction (such as  rash or fever)  If this is a new drug or your dose has changed, the side effects should go away. Always talk to your doctor about any side effects. Your doctor can suggest ways to manage them.  What can I do to take care of myself?  Your lifestyle  Keep to a schedule as much as you can. Get plenty of sleep. Try to manage your stress.   Most people with seizures can lead an active life. Plan ahead for activities that carry some risk. You should:    Always swim with a  or nilam present.    Wear a helmet for bike riding, skiing and contact sports, or whenever there is a risk of falling or head injury.    If you downhill ski or hike, go with a friend. You may need someone to get help if you have a seizure in remote areas. You should also consider a safety strap and harness when riding a ski lift.    Ask your doctor about contact sports.    Avoid activities where having a seizure would put you or someone else in danger.  Safe activities include jogging, aerobics, cross-country skiing, dancing, hiking, bowling and tennis.  It is a good idea to wear a medical alert bracelet. Tell family members, friends and co-workers about your seizure disorder.   Your home  Make sure your home is set up to be safe.     Carpet the floors in your home with heavy pile and thick padding.    Pad sharp corners of tables and other furniture. Look for rounded corners when you shop.    Choose chairs with arms to prevent falls.    Keep your bathroom and bedroom doors unlocked. Do not block these doors. If possible, hang them so they open outward, so that if someone falls against the door, it can still be opened.    Take showers only. In a bath, you risk drowning during a seizure.  ? Check often to make sure the bathroom drain works.  ? If you often fall during seizures, you may want to use a shower or tub seat with a safety strap.  ? You may want to use a handheld shower nozzle while sitting in the tub or shower.  ? Set water temperature  low so that you won't be burned if you lose consciousness while hot water is running.    Use curling irons or clothing irons with automatic shut-off switches to avoid burns.    Avoid using electrical appliances like hair dryers or electric razors in the bathroom or near water.    Replace all glass doors with safety glass or plastic.    Fill your plate or bowl near the stove instead of carrying all the food to the table.    When cooking, turn the pot and pan handles toward the back of the stove. Use long, heavy-duty oven mitts or holders when reaching into a hot oven.    Put guards around the fireplace or close screens when fires are burning.    Don't smoke or light fires when you're by yourself.    Don't carry hot ashes or lighted candles through the house.    Make sure motor-driven equipment, like lawn mowers, have a handle that will stop the machine if your hand releases normal pressure.    Driving your car is generally safe and legal once the seizures are under control. [place] state law says that a person must not drive for 3 months if a seizure resulted in loss of consciousness.  Brain altering chemicals  Many chemicals can cause seizures. Even small amounts of alcohol can trigger seizures. Illegal drugs can also cause severe seizures, even in people who do not normally have them.   Safety for children  It's easy to worry if your child has epilepsy, but you can make your house and everyday activities safer with a few simple adjustments:    A monitor in your child's bedroom may alert you to the sound of a typical seizure.    Avoid top bunks. A lower bunk, a regular bed, a futon or even a mattress on the floor are all safer places to sleep for a child with seizures.    A well-fitting helmet with a face guard may protect against head and facial injuries from severe drop seizures.    Have your child wear a life vest when near water, including the backyard pool.    Closely supervise showers.    Put a list of  first-aid steps on the refrigerator or some other place that's easy to find.    When you have babysitters, go over first-aid steps. Write down the phone number where you or a relative can be reached, the doctor's number and the number for emergency services.    If your child is going to sleep at a friend or relative's house overnight, make sure to send a list of first-aid steps. Also make sure an adult in the house knows what to expect and what to do if a seizure happens.    Remember, not every childhood injury is preventable, whether or not a child has seizures. Try to find balance between safety and overprotection.  How should family and friends respond to a seizure?  There is no way to stop a seizure. Family and caregivers can try to make sure you are safe.   When a seizure starts, family or caregivers should:     Keep you from falling.    Loosen tight clothing, especially around the neck.    Cushion the head.    Clear the area of furniture and sharp objects.    Turn you on your side. If you vomit, this helps prevent inhaling vomit.    Stay with you until you recover or medical help arrives.    Take your pulse and watch your rate of breathing, if possible.  Seizures typically last less than 3 minutes. Afterward, the person may be tired, confused and achy. He or she may need to sleep for several hours to recover.  Call 911 if:    A seizure lasts more than 5 minutes.    This is the first seizure ever.    Another seizure starts before awareness returns after a prior seizure.    The person had a seizure in water.    The person is pregnant, injured or has diabetes.    The person does not have a medical ID bracelet instructing what to do.    The person does not wake up or behave normally.    This seizure is different than the usual seizure.  When someone has a seizure,  Do NOT    Place anything between the person's teeth (including your fingers).    Try to hold the person down.    Give the person something to eat or  drink until he or she is fully awake and alert.    Move the person unless they are in danger or near a hazard.    Start CPR unless the seizure has stopped and the person is not breathing or has no pulse.    Try to stop the person from convulsing. They have no control over the seizure.  When should I call my doctor?  Call if you have:    Seizures more often. Especially if they have been under control for a long time.    Weakness, problems with seeing, or new problems with balance.    Unusual behavior    Side effects from medicines that are not going away or are getting worse.  Coping at home  Seizures affect those around you, too. Talk with your loved ones and learn their concerns.   People with seizures can hold many kinds of jobs. But here are some issues to keep in mind.  Your work needs to be safe. How well controlled are your seizures? Does the job involve tasks that may not be safe for you, such as driving or using heavy machinery?  You may want to tell your boss or co-workers about your seizures. This is your choice. But you may be safer if people at your workplace are prepared to respond to a seizure. If you are concerned about losing your job, know your rights. The Americans with Disabilities Act provides work-related protections for people with epilepsy.   Know the signs of depression. Depression can affect your thoughts and feelings. It can be caused by trouble coping with seizures. Call your doctor if you are having problems with this.   Resources  Epilepsy Foundation:  www.epilepsyfoundationmn.org  Epilepsy.com:www.epilepsy.com  [employer] [place] of [employer] and Stroke:www.ninds.nih.gov/disorders/epilepsy/epilepsy.htm  KidsHealth.org:www.kidshealth.org  The [place] Equal Employment Opportunity Commission:  www.eeoc.gov/facts/epilepsy.htm  Phone: 597.364.2446  For informational purposes only. Not to replace the advice of your health care provider.   Copyright   2011 [facility]. All  rights reserved. [employer] 011974 - REV 01/16.  For informational purposes only. Not to replace the advice of your health care provider.  Copyright   2018 [facility]. All rights reserved.

## 2019-11-09 ENCOUNTER — HEALTH MAINTENANCE LETTER (OUTPATIENT)
Age: 49
End: 2019-11-09

## 2020-06-08 ENCOUNTER — HOSPITAL ENCOUNTER (EMERGENCY)
Facility: CLINIC | Age: 50
Discharge: HOME | End: 2020-06-08
Attending: EMERGENCY MEDICINE | Admitting: EMERGENCY MEDICINE
Payer: COMMERCIAL

## 2020-06-08 VITALS
HEART RATE: 53 BPM | DIASTOLIC BLOOD PRESSURE: 94 MMHG | WEIGHT: 200 LBS | SYSTOLIC BLOOD PRESSURE: 159 MMHG | OXYGEN SATURATION: 93 % | BODY MASS INDEX: 27.12 KG/M2 | RESPIRATION RATE: 20 BRPM

## 2020-06-08 DIAGNOSIS — F10.10 ALCOHOL ABUSE: ICD-10-CM

## 2020-06-08 DIAGNOSIS — R56.9 SEIZURES (H): ICD-10-CM

## 2020-06-08 LAB
ALBUMIN SERPL-MCNC: 3.8 G/DL (ref 3.4–5)
ALP SERPL-CCNC: 62 U/L (ref 40–150)
ALT SERPL W P-5'-P-CCNC: 79 U/L (ref 0–70)
ANION GAP SERPL CALCULATED.3IONS-SCNC: 12 MMOL/L (ref 3–14)
AST SERPL W P-5'-P-CCNC: 94 U/L (ref 0–45)
BASOPHILS # BLD AUTO: 0 10E9/L (ref 0–0.2)
BASOPHILS NFR BLD AUTO: 0.1 %
BILIRUB SERPL-MCNC: 1 MG/DL (ref 0.2–1.3)
BUN SERPL-MCNC: 15 MG/DL (ref 7–30)
CALCIUM SERPL-MCNC: 9.6 MG/DL (ref 8.5–10.1)
CHLORIDE SERPL-SCNC: 100 MMOL/L (ref 94–109)
CO2 SERPL-SCNC: 23 MMOL/L (ref 20–32)
CREAT SERPL-MCNC: 0.79 MG/DL (ref 0.66–1.25)
DIFFERENTIAL METHOD BLD: ABNORMAL
EOSINOPHIL # BLD AUTO: 0 10E9/L (ref 0–0.7)
EOSINOPHIL NFR BLD AUTO: 0.1 %
ERYTHROCYTE [DISTWIDTH] IN BLOOD BY AUTOMATED COUNT: 12.2 % (ref 10–15)
GFR SERPL CREATININE-BSD FRML MDRD: >90 ML/MIN/{1.73_M2}
GLUCOSE SERPL-MCNC: 123 MG/DL (ref 70–99)
HCT VFR BLD AUTO: 43.2 % (ref 40–53)
HGB BLD-MCNC: 15 G/DL (ref 13.3–17.7)
IMM GRANULOCYTES # BLD: 0 10E9/L (ref 0–0.4)
IMM GRANULOCYTES NFR BLD: 0.3 %
LYMPHOCYTES # BLD AUTO: 0.6 10E9/L (ref 0.8–5.3)
LYMPHOCYTES NFR BLD AUTO: 8 %
MAGNESIUM SERPL-MCNC: 1.9 MG/DL (ref 1.6–2.3)
MCH RBC QN AUTO: 32.9 PG (ref 26.5–33)
MCHC RBC AUTO-ENTMCNC: 34.7 G/DL (ref 31.5–36.5)
MCV RBC AUTO: 95 FL (ref 78–100)
MONOCYTES # BLD AUTO: 0.4 10E9/L (ref 0–1.3)
MONOCYTES NFR BLD AUTO: 5.5 %
NEUTROPHILS # BLD AUTO: 6.2 10E9/L (ref 1.6–8.3)
NEUTROPHILS NFR BLD AUTO: 86 %
NRBC # BLD AUTO: 0 10*3/UL
NRBC BLD AUTO-RTO: 0 /100
PLATELET # BLD AUTO: 128 10E9/L (ref 150–450)
POTASSIUM SERPL-SCNC: 3.6 MMOL/L (ref 3.4–5.3)
PROT SERPL-MCNC: 7.6 G/DL (ref 6.8–8.8)
RBC # BLD AUTO: 4.56 10E12/L (ref 4.4–5.9)
SODIUM SERPL-SCNC: 135 MMOL/L (ref 133–144)
WBC # BLD AUTO: 7.3 10E9/L (ref 4–11)

## 2020-06-08 PROCEDURE — 83735 ASSAY OF MAGNESIUM: CPT | Performed by: EMERGENCY MEDICINE

## 2020-06-08 PROCEDURE — 80053 COMPREHEN METABOLIC PANEL: CPT | Performed by: EMERGENCY MEDICINE

## 2020-06-08 PROCEDURE — 96360 HYDRATION IV INFUSION INIT: CPT | Performed by: EMERGENCY MEDICINE

## 2020-06-08 PROCEDURE — 85025 COMPLETE CBC W/AUTO DIFF WBC: CPT | Performed by: EMERGENCY MEDICINE

## 2020-06-08 PROCEDURE — 99285 EMERGENCY DEPT VISIT HI MDM: CPT | Mod: 25 | Performed by: EMERGENCY MEDICINE

## 2020-06-08 PROCEDURE — 93010 ELECTROCARDIOGRAM REPORT: CPT | Mod: Z6 | Performed by: EMERGENCY MEDICINE

## 2020-06-08 PROCEDURE — 99284 EMERGENCY DEPT VISIT MOD MDM: CPT | Mod: 25 | Performed by: EMERGENCY MEDICINE

## 2020-06-08 PROCEDURE — 25000132 ZZH RX MED GY IP 250 OP 250 PS 637: Performed by: EMERGENCY MEDICINE

## 2020-06-08 PROCEDURE — 93005 ELECTROCARDIOGRAM TRACING: CPT | Performed by: EMERGENCY MEDICINE

## 2020-06-08 PROCEDURE — 25000128 H RX IP 250 OP 636: Performed by: EMERGENCY MEDICINE

## 2020-06-08 RX ORDER — DEXTROSE, SODIUM CHLORIDE, SODIUM LACTATE, POTASSIUM CHLORIDE, AND CALCIUM CHLORIDE 5; .6; .31; .03; .02 G/100ML; G/100ML; G/100ML; G/100ML; G/100ML
1000 INJECTION, SOLUTION INTRAVENOUS ONCE
Status: COMPLETED | OUTPATIENT
Start: 2020-06-08 | End: 2020-06-08

## 2020-06-08 RX ORDER — LEVETIRACETAM 500 MG/1
1000 TABLET ORAL 2 TIMES DAILY
Status: DISCONTINUED | OUTPATIENT
Start: 2020-06-08 | End: 2020-06-09 | Stop reason: HOSPADM

## 2020-06-08 RX ORDER — LEVETIRACETAM 500 MG/1
1000 TABLET ORAL 2 TIMES DAILY
Qty: 120 TABLET | Refills: 0 | Status: SHIPPED | OUTPATIENT
Start: 2020-06-08 | End: 2020-07-08

## 2020-06-08 RX ORDER — LEVETIRACETAM 500 MG/1
1000 TABLET ORAL 2 TIMES DAILY
Status: DISCONTINUED | OUTPATIENT
Start: 2020-06-09 | End: 2020-06-08

## 2020-06-08 RX ADMIN — SODIUM CHLORIDE, SODIUM LACTATE, POTASSIUM CHLORIDE, CALCIUM CHLORIDE AND DEXTROSE MONOHYDRATE 1000 ML: 5; 600; 310; 30; 20 INJECTION, SOLUTION INTRAVENOUS at 22:02

## 2020-06-08 RX ADMIN — LEVETIRACETAM 1000 MG: 500 TABLET, FILM COATED ORAL at 22:09

## 2020-06-08 ASSESSMENT — ENCOUNTER SYMPTOMS
FEVER: 0
ABDOMINAL PAIN: 0
COUGH: 0
VOMITING: 0
CHILLS: 0
DIFFICULTY URINATING: 0
SHORTNESS OF BREATH: 0
DIARRHEA: 0
HEADACHES: 0
SORE THROAT: 0
SEIZURES: 1
BACK PAIN: 1
LIGHT-HEADEDNESS: 0

## 2020-06-08 NOTE — ED AVS SNAPSHOT
[facility] Emergency Department  [address] [address]  Phone:  [phone]  Fax:  [phone]                                    Torey Coreas   MRN: [id number]    Department:  [facility] Emergency Department   Date of Visit:  6/8/2020           After Visit Summary Signature Page    I have received my discharge instructions, and my questions have been answered. I have discussed any challenges I see with this plan with the nurse or doctor.    ..........................................................................................................................................  Patient/Patient Representative Signature      ..........................................................................................................................................  Patient Representative Print Name and Relationship to Patient    ..................................................               ................................................  Date                                   Time    ..........................................................................................................................................  Reviewed by Signature/Title    ...................................................              ..............................................  Date                                               Time          22EPIC Rev 08/18

## 2020-06-09 NOTE — ED NOTES
Wife called requesting update. Per wife, pt FIRST seizure occurred at 1600, 2nd seizure at 1800 and third seizure at 1900. Pt seizure of tonic-clonic movement and altered breathing last about 2-3 minutes per wife. Pt then has 10 minutes of confusion/drowsiness after each seizure. Wife concerned about increased frequency of seizing and hx of PRES. Per wife, elevation of BP will cause seizure. (MD aware of wife's report).

## 2020-06-09 NOTE — DISCHARGE INSTRUCTIONS
It will be important that you refrain from alcohol use. Take levitiracetam 1000 mg twice daily. A prescription has been sent to your preferred pharmacy. Follow up with your primary care physician in 2-3 days. If you have another seizure, or develop new or concerning symptoms, please return to the Emergency Department for further evaluation.

## 2020-06-09 NOTE — ED TRIAGE NOTES
"Pt stopped drinking Friday. Pt states \"I'm an alcoholic.\" pt had 3 seizures today witnessed by spouse. Pt states he was told they lasted 3 minutes. Pt on arrival is a/o. Pt states has had 5 seizures in his life, probably due to alcohol withdrawal.   "

## 2020-06-09 NOTE — ED NOTES
"Pt reports that he is an alcoholic. Last drink Friday. Pt had seizure x3 today, last one at 1600 lasting about 3-4 minutes.  Pt wife/son were worried with last seizure and called EMS. Pt no longer seizing uopn their arrival and patient declined ambulance ride to ED. Pt came via wife in private vehicle. Pt reports he has had seizures in the past due to withdrawal. Pt denies wanting treatment for alcoholism at this time--states \"I can quit on my own\" . Pt denies attending AA in the past and declined offer of resources.   "

## 2020-06-09 NOTE — ED PROVIDER NOTES
History     Chief Complaint   Patient presents with     Seizures     HPI  Torey Coreas is a 49 year old male with history of alcohol dependence, chronic low back pain, post traumatic stress disorder, depression, hypertension, press syndrome, who presents for evaluation after having 3 seizures at home.  Patient reports that he has [facility]  Pain medications have been reduced and sometimes he does not have enough and will drink alcohol to manage his pain.  Was drinking heavily this past weekend with no alcohol use in 2 days.  Had 3, 2 to 3-minute tonic-clonic seizures followed by 10 minutes of postictal state witnessed by his wife.  First one at 4 PM last one at 7 PM.  Patient does not feel shaky does not feel he is acutely withdrawing from alcohol.  Patient was taking levetiracetam 1000 mg twice daily for seizures posteriorly depress syndrome.  Also taking 25 mg carvedilol.  Patient is hoping to get something to help mitch his seizures but is not willing to be admitted to hospital.  Denies fever, chills, headache, nausea, vomiting, chest pain, shortness of breath.    The patient's PMHx, Surgical Hx, Allergies, and Medications were all reviewed with the patient.    Allergies:  Allergies   Allergen Reactions     Lisinopril Swelling     Angioedema     Wellbutrin [Bupropion] Other (See Comments)     Seizure        Problem List:    Patient Active Problem List    Diagnosis Date Noted     Alcohol use disorder, severe, dependence (H) 11/27/2017     Priority: Medium     Prediabetes 03/27/2017     Priority: Medium     Encounter for long-term (current) use of high-risk medication 02/17/2017     Priority: Medium     Chronic midline low back pain, with sciatica presence unspecified 02/17/2017     Priority: Medium     Chronic pain syndrome 02/01/2016     Priority: Medium     Patient is followed by BANDAR GORDON for ongoing prescription of pain medication.  All refills should be  approved by this provider, or covering partner.    Medication(s):  oxycodone.   Maximum quantity per month: 260  Clinic visit frequency required: Q 2 months     Controlled substance agreement on file: Yes       Date(s): 3/21/16    Pain Clinic evaluation in the past: Yes       Date/Location:  12/16/[facility]  Was seen by [facility] prior    DIRE Total Score(s):    3/22/2016   Total Score 14       Last [facility] website verification:  done on 3/21/16   [email or web address]         Generalized anxiety disorder 01/07/2016     Priority: Medium     Posterior reversible encephalopathy syndrome 10/22/2015     Priority: Medium     Health Care Home 03/27/2015     Priority: Medium     State Tier Level:    Status:  Closed 12-16-15  No services needed  Care Coordinator:  Michelle Blanca    See Letters for [facility] Care Plan             Alcohol abuse 03/21/2015     Priority: Medium     3/20/15-3/23/2015- Detox at the [facility]     Started drinking again and  stopped 5/2016        CARDIOVASCULAR SCREENING; LDL GOAL LESS THAN 160 01/14/2015     Priority: Medium     Major depression 01/14/2015     Priority: Medium     Chronic low back pain 01/09/2015     Priority: Medium     HTN, goal below 140/90 01/09/2015     Priority: Medium     PRES (posterior reversible encephalopathy syndrome) 01/09/2015     Priority: Medium     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 Provider to review       Hypertensive emergency 11/10/2014     Priority: Medium     Seizure (H) 09/22/2014     Priority: Medium        Past Medical History:    Past Medical History:   Diagnosis Date     Alcohol abuse      Chronic low back pain      Depression      HTN (hypertension)      Partial epilepsy (H)      PRES (posterior reversible encephalopathy syndrome)        Past Surgical History:    Past Surgical History:   Procedure Laterality Date     ABDOMEN  SURGERY  6/8/15    spinal fusion     C REPAIR CRUCIATE LIGAMENT,KNEE Right 2010     FUSION LUMBAR ANTERIOR, FUSION LUMBAR POSTERIOR TWO LEVELS, COMBINED  2015    Segmental Solera pedicle screw instrumentation L4-S1; posteriolateral arthrodesis L4-L5, L5-S1; laminoforaminotomies for purposes of spinal stenosis decompression, bilateral L4-L5 and bilateral L5-S1; left posterior ilium bone marrow aspirate     GENITOURINARY SURGERY  2010    vasectomy     HERNIORRHAPHY INGUINAL Right        Family History:    Family History   Problem Relation Age of Onset     Back Pain Mother      Depression Mother         none     Back Pain Father      Diabetes Father         none     Substance Abuse Father         alcoholic       Social History:  Marital Status:   [2]  Social History     Tobacco Use     Smoking status: Former Smoker     Packs/day: 0.50     Years: 5.00     Pack years: 2.50     Types: Dip, chew, snus or snuff     Start date: 1989     Last attempt to quit: 2008     Years since quittin.3     Smokeless tobacco: Current User     Tobacco comment: non smoker   Substance Use Topics     Alcohol use: No     Alcohol/week: 0.0 standard drinks     Comment: h/o ETOH abuse      Drug use: No        Medications:    levETIRAcetam (KEPPRA) 500 MG tablet  betamethasone valerate (VALISONE) 0.1 % cream  carvedilol (COREG) 25 MG tablet  cyclobenzaprine (FLEXERIL) 10 MG tablet  diazepam (VALIUM) 5 MG tablet  folic acid (FOLVITE) 1 MG tablet  hydrochlorothiazide (HYDRODIURIL) 25 MG tablet  levETIRAcetam 1000 MG TABS  methadone (DOLOPHINE) 5 MG tablet  nortriptyline (PAMELOR) 25 MG capsule  sertraline (ZOLOFT) 100 MG tablet  triamcinolone (KENALOG) 0.1 % cream          Review of Systems   Constitutional: Negative for chills and fever.   HENT: Negative for sore throat.    Eyes: Negative for visual disturbance.   Respiratory: Negative for cough and shortness of breath.    Cardiovascular: Negative for chest pain.    Gastrointestinal: Negative for abdominal pain, diarrhea and vomiting.   Genitourinary: Negative for difficulty urinating.   Musculoskeletal: Positive for back pain.   Skin: Negative for rash.   Allergic/Immunologic: Negative for immunocompromised state.   Neurological: Positive for seizures. Negative for light-headedness and headaches.       Physical Exam   BP: (!) 145/98  Pulse: 64  Heart Rate: 64  Resp: 18  Weight: 90.7 kg (200 lb)  SpO2: 95 %    Physical Exam  GEN: Awake, alert, and cooperative. No acute distress.   HENT: MMM. External ears and nose normal bilaterally. Atraumatic. No tongue wounds.  EYES: EOM intact. Conjunctiva clear. No discharge. No nystagmus.  NECK: Supple, symmetric. Non tender  CV : Regular rate and rhythm. Extremities warm and well perfused.   PULM: Normal effort.   ABD: Soft, non-tender, non-distended. No rebound or guarding.   NEURO: Normal speech. Following commands. CN II-XII grossly intact. Answering questions and interacting appropriately. No focal motor of sensory deficits. Sensation intact to light touch.   EXT: No gross deformity. Warm and well perfused  INT: Warm. No diaphoresis. Normal color.        ED Course        Procedures             EKG Interpretation:      Interpreted by Baljit Elizondo MD  Time reviewed: 2205  Symptoms at time of EKG: None   Rhythm: sinus bradycardia  Rate: 55  Axis: Normal  Ectopy: none  Conduction: normal  ST Segments/ T Waves: Non-specific ST-T wave changes  Q Waves: none  Comparison to prior: Unchanged from 1/7/2015    Clinical Impression: non-specific EKG. Sinus bradycardia              Critical Care time:  none               Results for orders placed or performed during the hospital encounter of 06/08/20 (from the past 24 hour(s))   Comprehensive metabolic panel   Result Value Ref Range    Sodium 135 133 - 144 mmol/L    Potassium 3.6 3.4 - 5.3 mmol/L    Chloride 100 94 - 109 mmol/L    Carbon Dioxide 23 20 - 32 mmol/L    Anion Gap 12 3 - 14  mmol/L    Glucose 123 (H) 70 - 99 mg/dL    Urea Nitrogen 15 7 - 30 mg/dL    Creatinine 0.79 0.66 - 1.25 mg/dL    GFR Estimate >90 >60 mL/min/[1.73_m2]    GFR Estimate If Black >90 >60 mL/min/[1.73_m2]    Calcium 9.6 8.5 - 10.1 mg/dL    Bilirubin Total 1.0 0.2 - 1.3 mg/dL    Albumin 3.8 3.4 - 5.0 g/dL    Protein Total 7.6 6.8 - 8.8 g/dL    Alkaline Phosphatase 62 40 - 150 U/L    ALT 79 (H) 0 - 70 U/L    AST 94 (H) 0 - 45 U/L   CBC with platelets, differential   Result Value Ref Range    WBC 7.3 4.0 - 11.0 10e9/L    RBC Count 4.56 4.4 - 5.9 10e12/L    Hemoglobin 15.0 13.3 - 17.7 g/dL    Hematocrit 43.2 40.0 - 53.0 %    MCV 95 78 - 100 fl    MCH 32.9 26.5 - 33.0 pg    MCHC 34.7 31.5 - 36.5 g/dL    RDW 12.2 10.0 - 15.0 %    Platelet Count 128 (L) 150 - 450 10e9/L    Diff Method Automated Method     % Neutrophils 86.0 %    % Lymphocytes 8.0 %    % Monocytes 5.5 %    % Eosinophils 0.1 %    % Basophils 0.1 %    % Immature Granulocytes 0.3 %    Nucleated RBCs 0 0 /100    Absolute Neutrophil 6.2 1.6 - 8.3 10e9/L    Absolute Lymphocytes 0.6 (L) 0.8 - 5.3 10e9/L    Absolute Monocytes 0.4 0.0 - 1.3 10e9/L    Absolute Eosinophils 0.0 0.0 - 0.7 10e9/L    Absolute Basophils 0.0 0.0 - 0.2 10e9/L    Abs Immature Granulocytes 0.0 0 - 0.4 10e9/L    Absolute Nucleated RBC 0.0    Magnesium   Result Value Ref Range    Magnesium 1.9 1.6 - 2.3 mg/dL       Medications   levETIRAcetam (KEPPRA) tablet 1,000 mg (1,000 mg Oral Given 6/8/20 2209)   dextrose 5% in lactated ringers infusion (0 mLs Intravenous Stopped 6/8/20 0886)       Assessments & Plan (with Medical Decision Making)   49 year old male with past medical history of chronic pain, PTSD, press syndrome, alcohol abuse, who presents after 3 seizures this afternoon witnessed by family.  Patient previously on levetiracetam for seizures but has been out for the past month.  On arrival to Emergency Department, vital signs were notable for blood pressure of 145/98 ,pulse of 64.  On exam  patient not seem acutely distressed and no clinical signs of acute alcohol withdrawal.  At no time was he tremulous, diaphoretic, or tachycardic.  Mild hypertension through his day.  I do not feel that his seizures are related to acute alcohol withdrawal.  After reviewing his medical record, speaking to patient, and getting in additional historical information from his wife, he has been taking Keppra for seizure activity.  He reports that he has not been taking it for a little over a month.  He was given 1000 mg Keppra in the emergency department.  CMP grossly normal with glucose of 123, normal magnesium of 1.9, and CBC grossly normal with mild thrombocytopenia of 128.  He was given 1 L of D5 LR.  Also tolerating p.o. fluids in the emergency department.  Did have a conversation with him about possibly staying in the hospital which she flat out refuses.  I spoke to his wife on numerous occasions and kept her updated throughout his ED course.  Plan for him to refrain from alcohol use, and [facility] and can further see neurology as needed for consideration of outpatient MRI/EEG if is not been done so already.  He expresses agreement understanding of plan and discharged in improved condition.    I have reviewed the nursing notes.         New Prescriptions    LEVETIRACETAM (KEPPRA) 500 MG TABLET    Take 2 tablets (1,000 mg) by mouth 2 times daily       Final diagnoses:   Seizures (H)   Alcohol abuse     Baljit Elizondo MD    6/8/2020   [facility] EMERGENCY DEPARTMENT    Disclaimer: This note consists of words and symbols derived from keyboarding and dictation using voice recognition software.  As a result, there may be errors that have gone undetected.  Please consider this when interpreting information found in this note.             Baljit Elizondo MD  06/08/20 [id number]

## 2022-04-02 NOTE — TELEPHONE ENCOUNTER
"Requested Prescriptions   Pending Prescriptions Disp Refills     sertraline (ZOLOFT) 100 MG tablet [Pharmacy Med Name: SERTRALINE HCL TABS 100MG] 180 tablet 1     Sig: TAKE 2 TABLETS DAILY    SSRIs Protocol Failed    9/11/2018 12:06 AM       Failed - PHQ-9 score less than 5 in past 6 months    Please review last PHQ-9 score.          Passed - Patient is age 18 or older       Passed - Recent (6 mo) or future (30 days) visit within the authorizing provider's specialty    Patient had office visit in the last 6 months or has a visit in the next 30 days with authorizing provider or within the authorizing provider's specialty.  See \"Patient Info\" tab in inbasket, or \"Choose Columns\" in Meds & Orders section of the refill encounter.            Last Written Prescription Date:  3/15/18  Last Fill Quantity: 180,  # refills: 1   Last office visit: 5/4/2018 with prescribing provider:     Future Office Visit:      " Department of Anesthesiology  Preprocedure Note       Name:  Abdi Davey   Age:  51 y.o.  :  1972                                          MRN:  [id number]         Date:  2022      Surgeon: Xavier Quiñonez):  Radha Quevedo MD    Procedure: Procedure(s):  EGD DIAGNOSTIC ONLY    Medications prior to admission:   Prior to Admission medications    Medication Sig Start Date End Date Taking? Authorizing Provider   Multiple Vitamin (MULTIVITAMIN ADULT PO) Take 1 capsule by mouth daily    Historical Provider, MD   Calcium Carbonate-Vitamin D (OYSTER SHELL CALCIUM/D) 500-200 MG-UNIT TABS Take 1 tablet by mouth 2 times daily (with meals)    Historical Provider, MD   ibuprofen (ADVIL;MOTRIN) 800 MG tablet Take 800 mg by mouth every 8 hours as needed    Historical Provider, MD   levETIRAcetam (KEPPRA) 1000 MG tablet Take 2 tablets by mouth daily 3/21/22   Meseret Wick,    levETIRAcetam (KEPPRA) 500 MG tablet Take 5 tablets by mouth nightly 3/20/22   Corinen Black, DO   melatonin 5 MG TBDP disintegrating tablet Take 1 tablet by mouth nightly 3/20/22   Corinne Black, DO   QUEtiapine (SEROQUEL) 25 MG tablet Take 0.5 tablets by mouth nightly for 3 days 3/20/22 3/23/22  Corinne Black, DO   pantoprazole (PROTONIX) 40 MG tablet Take 1 tablet by mouth every morning (before breakfast) 3/18/22   Elisa Todd MD   lacosamide (VIMPAT) 200 MG tablet Take 1 tablet by mouth 2 times daily for 30 days.  3/17/22 4/16/22  Elisa Todd MD       Current medications:    Current Facility-Administered Medications   Medication Dose Route Frequency Provider Last Rate Last Admin    0.9 % sodium chloride infusion   IntraVENous PRN Nanette Cleveland MD        vancomycin (VANCOCIN) 1,250 mg in dextrose 5 % 250 mL IVPB  1,250 mg IntraVENous Q8H Yuliet Bashir MD   Stopped at 22 025    [Held by provider] argatroban infusion 50mg in 0.9% sodium chloride 50 mL (premix)  0.0625-10 mcg/kg/min IntraVENous Continuous Yuliet Bashir MD   Held at 03/30/22 2100    apixaban (ELIQUIS) tablet 10 mg  10 mg Oral BID Jai Calabrese MD   10 mg at 04/01/22 2140    Followed by   Rosa Isela White ON 4/6/2022] apixaban (ELIQUIS) tablet 5 mg  5 mg Oral BID Jai Calabrese MD        0.9 % sodium chloride infusion   IntraVENous PRN Phyllis Palomo MD        sodium chloride flush 0.9 % injection 5-40 mL  5-40 mL IntraVENous 2 times per day Ricco Saba MD   10 mL at 04/01/22 2140    sodium chloride flush 0.9 % injection 5-40 mL  5-40 mL IntraVENous PRN Ricco Saba MD   10 mL at 03/31/22 0900    0.9 % sodium chloride infusion  25 mL IntraVENous PRN Ricco Saba  mL/hr at 03/31/22 1615 25 mL at 03/31/22 1615    metoprolol (LOPRESSOR) injection 5 mg  5 mg IntraVENous Once Jai Calabrese MD        oxyCODONE (ROXICODONE) immediate release tablet 5 mg  5 mg Oral Q4H PRN Ricco Saba MD   5 mg at 04/01/22 0234    Or    oxyCODONE (ROXICODONE) immediate release tablet 10 mg  10 mg Oral Q4H PRN Ricco Saba MD   10 mg at 04/02/22 0224    LORazepam (ATIVAN) injection 1 mg  1 mg IntraVENous Q6H PRN Jai Calabrese MD        [Held by provider] clonazePAM (KLONOPIN) tablet 0.5 mg  0.5 mg Oral BID Ricco Saba MD   0.5 mg at 03/29/22 0846    QUEtiapine (SEROQUEL) tablet 12.5 mg  12.5 mg Oral Nightly Ricco Saba MD   12.5 mg at 04/01/22 2140    insulin regular (HUMULIN R;NOVOLIN R) injection 10 Units  10 Units IntraVENous Once Jai Calabrese MD        And    dextrose 10 % infusion  250 mL IntraVENous Once Jai Calabrese MD        glucose (GLUTOSE) 40 % oral gel 15 g  15 g Oral PRN Jai Calabrese MD        dextrose 50 % IV solution  12.5 g IntraVENous PRN Jai Calabrese MD        glucagon (rDNA) injection 1 mg  1 mg IntraMUSCular PRN Jai Calabrese MD        dextrose 5 % solution  100 mL/hr IntraVENous PRN Jai Calabrese MD        lacosamide (VIMPAT) tablet 200 mg  200 mg Oral BID Phyllis Palomo MD   200 mg at 04/01/22 3120    levETIRAcetam (KEPPRA) tablet 2,000 mg  2,000 mg Oral Daily Aliyah Velasquez MD   2,000 mg at 04/01/22 4813    levETIRAcetam (KEPPRA) tablet 2,500 mg  2,500 mg Oral Nightly Aliyah Velasquez MD   2,500 mg at 04/01/22 2142    melatonin disintegrating tablet 5 mg  5 mg Oral Nightly Aliyah Velasquez MD   5 mg at 04/01/22 2140    pantoprazole (PROTONIX) tablet 40 mg  40 mg Oral QAM AC Aliyah Velasquez MD   40 mg at 04/02/22 1346    sodium chloride flush 0.9 % injection 5-40 mL  5-40 mL IntraVENous 2 times per day Alyiah Velasquez MD   10 mL at 04/01/22 2143    sodium chloride flush 0.9 % injection 5-40 mL  5-40 mL IntraVENous PRN Aliyah Velasquez MD        0.9 % sodium chloride infusion  25 mL IntraVENous PRN Aliyah Velasquez  mL/hr at 04/01/22 1736 25 mL at 04/01/22 1736    ondansetron (ZOFRAN-ODT) disintegrating tablet 4 mg  4 mg Oral Q8H PRN Aliyah Velasquez MD        Or    ondansetron [facility]) injection 4 mg  4 mg IntraVENous Q6H PRN Aliyah Velasquez MD        polyethylene glycol [facility]) packet 17 g  17 g Oral Daily PRN Aliyah Velaqsuez MD   17 g at 03/26/22 0226    acetaminophen (TYLENOL) tablet 650 mg  650 mg Oral Q6H PRN Aliyah Velasquez MD   650 mg at 03/31/22 1240    Or    acetaminophen (TYLENOL) suppository 650 mg  650 mg Rectal Q6H PRN Aliyah Velasquez MD           Allergies:  No Known Allergies    Problem List:    Patient Active Problem List   Diagnosis Code    Thyroid nodule E04.1    Brain metastases (HCC) C79.31    Brain mass G93.89    Status epilepticus ([address] G40.901    Duct cell carcinoma (HCC) C80.1    Cerebral edema ([address] G93.6    Seizure ([address] R56.9    Deep venous thrombosis ([address] I82.409    Fever R50.9    Phlebitis I80.9    S/P craniotomy Z98.890       Past Medical History:        Diagnosis Date    Thyroid nodule        Past Surgical History:        Procedure Laterality Date    CRANIOTOMY Left 3/12/2022    LEFT TEMPORAL PARIETAL OCCIPITAL CRANIOTOMY FOR TUMOR RESECTION performed by Joanna Sanders MD at [address] IR IVC FILTER PLACEMENT W IMAGING N/A 03/16/2022    [facility]    IR IVC FILTER PLACEMENT W IMAGING  3/16/2022    IR IVC FILTER PLACEMENT W IMAGING 3/16/2022 [facility] PROCED[facility]    IR THROMB [address] VEIN  3/28/2022    IR THROMB [address] 3/28/2022 [facility] PROCEDURES       Social History:    Social History     Tobacco Use    Smoking status: Never Smoker    Smokeless tobacco: Never Used   Substance Use Topics    Alcohol use: Never     Alcohol/week: 0.0 standard drinks                                Counseling given: Not Answered      Vital Signs (Current):   Vitals:    04/01/22 2021 04/02/22 0016 04/02/22 0216 04/02/22 0414   BP:  110/73 113/74 125/74   Pulse: 112 115 112 109   Resp:  16 18 18   Temp:  99.1 °F (37.3 °C)  99.4 °F (37.4 °C)   TempSrc:  Oral  Oral   SpO2:  94%  95%   Weight:       Height:                                                  BP Readings from Last 3 Encounters:   04/02/22 125/74   03/20/22 133/83   03/17/22 111/72       NPO Status:                                                                                 BMI:   Wt Readings from Last 3 Encounters:   03/26/22 202 lb 13.2 oz (92 kg)   03/17/22 182 lb (82.6 kg)   03/15/22 186 lb 4.6 oz (84.5 kg)     Body mass index is 35.93 kg/m².     CBC:   Lab Results   Component Value Date    WBC 9.6 04/02/2022    RBC 2.86 04/02/2022    HGB 8.0 04/02/2022    HCT 23.9 04/02/2022    MCV 83.6 04/02/2022    RDW 14.9 04/02/2022     04/02/2022       CMP:   Lab Results   Component Value Date     04/02/2022    K 4.4 04/02/2022    K 4.8 03/28/2022    CL 95 04/02/2022    CO2 23 04/02/2022    BUN 9 04/02/2022    CREATININE <0.5 04/02/2022    GFRAA >60 04/02/2022    AGRATIO 1.0 03/03/2022    LABGLOM >60 04/02/2022    GLUCOSE 107 04/02/2022    PROT 5.8 04/02/2022    CALCIUM 8.4 04/02/2022    BILITOT <0.2 04/02/2022    ALKPHOS 94 04/02/2022    AST 44 04/02/2022    ALT 55 04/02/2022       POC Tests:   Recent Labs     04/01/22 2138   POCGLU 143*       Coags:   Lab Results   Component Value Date    PROTIME 13.9 03/25/2022    INR 1.22 03/25/2022    APTT 37.4 03/30/2022       HCG (If Applicable):   Lab Results   Component Value Date    PREGTESTUR Negative 03/11/2022        ABGs:   Lab Results   Component Value Date    PHART 7.466 03/29/2022    PO2ART 84.9 03/29/2022    ICC6QLG 31.3 03/29/2022    FRP3QBB 23 03/29/2022    BEART -1.0 03/29/2022    B2IYCQWM 99 03/29/2022        Type & Screen (If Applicable):  No results found for: LABABO, LABRH    Drug/Infectious Status (If Applicable):  No results found for: HIV, HEPCAB    COVID-19 Screening (If Applicable): No results found for: COVID19        Anesthesia Evaluation    Airway: Mallampati: II  TM distance: >3 FB   Neck ROM: full  Mouth opening: > = 3 FB Dental:          Pulmonary:                              Cardiovascular:            Rhythm: regular  Rate: normal                    Neuro/Psych:               GI/Hepatic/Renal:             Endo/Other:    (+) malignancy/cancer. Abdominal:             Vascular: Other Findings:             Anesthesia Plan      MAC     ASA 4       Induction: intravenous. Anesthetic plan and risks discussed with patient. Plan discussed with CRNA.                   Augustus Appiah MD   4/2/2022